# Patient Record
Sex: MALE | Race: BLACK OR AFRICAN AMERICAN | NOT HISPANIC OR LATINO | URBAN - METROPOLITAN AREA
[De-identification: names, ages, dates, MRNs, and addresses within clinical notes are randomized per-mention and may not be internally consistent; named-entity substitution may affect disease eponyms.]

---
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
## 2024-03-04 ENCOUNTER — SOCIAL WORK (OUTPATIENT)
Dept: PEDIATRICS | Facility: CLINIC | Age: 19
End: 2024-03-04
Payer: COMMERCIAL

## 2024-03-04 ENCOUNTER — OFFICE VISIT (OUTPATIENT)
Dept: PEDIATRICS | Facility: CLINIC | Age: 19
End: 2024-03-04
Payer: COMMERCIAL

## 2024-03-04 ENCOUNTER — LAB (OUTPATIENT)
Dept: LAB | Facility: LAB | Age: 19
End: 2024-03-04
Payer: COMMERCIAL

## 2024-03-04 VITALS
TEMPERATURE: 97.5 F | RESPIRATION RATE: 16 BRPM | SYSTOLIC BLOOD PRESSURE: 136 MMHG | DIASTOLIC BLOOD PRESSURE: 89 MMHG | BODY MASS INDEX: 46.92 KG/M2 | WEIGHT: 298.94 LBS | HEIGHT: 67 IN | HEART RATE: 84 BPM

## 2024-03-04 DIAGNOSIS — R03.0 ELEVATED BP WITHOUT DIAGNOSIS OF HYPERTENSION: ICD-10-CM

## 2024-03-04 DIAGNOSIS — Z59.41 FOOD INSECURITY: ICD-10-CM

## 2024-03-04 DIAGNOSIS — Z97.3 WEARS GLASSES: ICD-10-CM

## 2024-03-04 DIAGNOSIS — R06.83 SNORING: ICD-10-CM

## 2024-03-04 DIAGNOSIS — Z77.22 SECOND HAND TOBACCO SMOKE EXPOSURE: ICD-10-CM

## 2024-03-04 DIAGNOSIS — Z00.121 ENCOUNTER FOR WELL ADOLESCENT VISIT WITH ABNORMAL FINDINGS: Primary | ICD-10-CM

## 2024-03-04 DIAGNOSIS — R40.0 DAYTIME SLEEPINESS: ICD-10-CM

## 2024-03-04 DIAGNOSIS — Z72.821 POOR SLEEP HYGIENE: ICD-10-CM

## 2024-03-04 DIAGNOSIS — Z87.898 HISTORY OF BRUISING EASILY: ICD-10-CM

## 2024-03-04 DIAGNOSIS — R07.89 OTHER CHEST PAIN: ICD-10-CM

## 2024-03-04 DIAGNOSIS — Z00.121 ENCOUNTER FOR WELL ADOLESCENT VISIT WITH ABNORMAL FINDINGS: ICD-10-CM

## 2024-03-04 DIAGNOSIS — F12.90 MARIJUANA USE: ICD-10-CM

## 2024-03-04 DIAGNOSIS — J45.30 ASTHMA, CHRONIC, MILD PERSISTENT, UNCOMPLICATED (HHS-HCC): ICD-10-CM

## 2024-03-04 DIAGNOSIS — F32.A DEPRESSIVE DISORDER: ICD-10-CM

## 2024-03-04 DIAGNOSIS — L83 ACANTHOSIS NIGRICANS: ICD-10-CM

## 2024-03-04 DIAGNOSIS — Z23 IMMUNIZATION DUE: ICD-10-CM

## 2024-03-04 DIAGNOSIS — J30.89 NON-SEASONAL ALLERGIC RHINITIS, UNSPECIFIED TRIGGER: ICD-10-CM

## 2024-03-04 PROBLEM — J30.9 ALLERGIC RHINITIS: Status: RESOLVED | Noted: 2023-12-27 | Resolved: 2024-03-04

## 2024-03-04 PROBLEM — J45.909 ASTHMA (HHS-HCC): Status: ACTIVE | Noted: 2024-03-04

## 2024-03-04 LAB
25(OH)D3 SERPL-MCNC: 20 NG/ML (ref 30–100)
ALT SERPL W P-5'-P-CCNC: 16 U/L (ref 10–52)
BASOPHILS # BLD AUTO: 0.03 X10*3/UL (ref 0–0.1)
BASOPHILS NFR BLD AUTO: 0.3 %
CHOLEST SERPL-MCNC: 179 MG/DL (ref 0–199)
CHOLESTEROL/HDL RATIO: 4
EOSINOPHIL # BLD AUTO: 0.19 X10*3/UL (ref 0–0.7)
EOSINOPHIL NFR BLD AUTO: 2 %
ERYTHROCYTE [DISTWIDTH] IN BLOOD BY AUTOMATED COUNT: 13.5 % (ref 11.5–14.5)
EST. AVERAGE GLUCOSE BLD GHB EST-MCNC: 97 MG/DL
HBA1C MFR BLD: 5 %
HCT VFR BLD AUTO: 50.7 % (ref 41–52)
HDLC SERPL-MCNC: 44.5 MG/DL
HGB BLD-MCNC: 16.7 G/DL (ref 13.5–17.5)
IMM GRANULOCYTES # BLD AUTO: 0.1 X10*3/UL (ref 0–0.7)
IMM GRANULOCYTES NFR BLD AUTO: 1 % (ref 0–0.9)
LDLC SERPL CALC-MCNC: 109 MG/DL
LYMPHOCYTES # BLD AUTO: 2.13 X10*3/UL (ref 1.2–4.8)
LYMPHOCYTES NFR BLD AUTO: 22.3 %
MCH RBC QN AUTO: 29.3 PG (ref 26–34)
MCHC RBC AUTO-ENTMCNC: 32.9 G/DL (ref 32–36)
MCV RBC AUTO: 89 FL (ref 80–100)
MONOCYTES # BLD AUTO: 0.69 X10*3/UL (ref 0.1–1)
MONOCYTES NFR BLD AUTO: 7.2 %
NEUTROPHILS # BLD AUTO: 6.42 X10*3/UL (ref 1.2–7.7)
NEUTROPHILS NFR BLD AUTO: 67.2 %
NON HDL CHOLESTEROL: 135 MG/DL (ref 0–119)
NRBC BLD-RTO: 0 /100 WBCS (ref 0–0)
PLATELET # BLD AUTO: 243 X10*3/UL (ref 150–450)
RBC # BLD AUTO: 5.69 X10*6/UL (ref 4.5–5.9)
TRIGL SERPL-MCNC: 130 MG/DL (ref 0–149)
VLDL: 26 MG/DL (ref 0–40)
WBC # BLD AUTO: 9.6 X10*3/UL (ref 4.4–11.3)

## 2024-03-04 PROCEDURE — 90686 IIV4 VACC NO PRSV 0.5 ML IM: CPT | Performed by: PEDIATRICS

## 2024-03-04 PROCEDURE — 96127 BRIEF EMOTIONAL/BEHAV ASSMT: CPT | Mod: 59 | Performed by: PEDIATRICS

## 2024-03-04 PROCEDURE — 80061 LIPID PANEL: CPT

## 2024-03-04 PROCEDURE — 99395 PREV VISIT EST AGE 18-39: CPT | Performed by: PEDIATRICS

## 2024-03-04 PROCEDURE — 96127 BRIEF EMOTIONAL/BEHAV ASSMT: CPT | Performed by: STUDENT IN AN ORGANIZED HEALTH CARE EDUCATION/TRAINING PROGRAM

## 2024-03-04 PROCEDURE — 99395 PREV VISIT EST AGE 18-39: CPT | Mod: GC | Performed by: PEDIATRICS

## 2024-03-04 PROCEDURE — 99213 OFFICE O/P EST LOW 20 MIN: CPT | Mod: 25 | Performed by: PEDIATRICS

## 2024-03-04 PROCEDURE — 36415 COLL VENOUS BLD VENIPUNCTURE: CPT

## 2024-03-04 PROCEDURE — 99213 OFFICE O/P EST LOW 20 MIN: CPT | Performed by: PEDIATRICS

## 2024-03-04 PROCEDURE — 96127 BRIEF EMOTIONAL/BEHAV ASSMT: CPT | Mod: 59,GC | Performed by: STUDENT IN AN ORGANIZED HEALTH CARE EDUCATION/TRAINING PROGRAM

## 2024-03-04 PROCEDURE — 99214 OFFICE O/P EST MOD 30 MIN: CPT | Performed by: PEDIATRICS

## 2024-03-04 PROCEDURE — 84460 ALANINE AMINO (ALT) (SGPT): CPT

## 2024-03-04 PROCEDURE — 83036 HEMOGLOBIN GLYCOSYLATED A1C: CPT

## 2024-03-04 PROCEDURE — 3008F BODY MASS INDEX DOCD: CPT | Performed by: PEDIATRICS

## 2024-03-04 PROCEDURE — 85025 COMPLETE CBC W/AUTO DIFF WBC: CPT

## 2024-03-04 PROCEDURE — 82306 VITAMIN D 25 HYDROXY: CPT

## 2024-03-04 PROCEDURE — 91320 SARSCV2 VAC 30MCG TRS-SUC IM: CPT | Performed by: PEDIATRICS

## 2024-03-04 RX ORDER — ALBUTEROL SULFATE 90 UG/1
2-4 AEROSOL, METERED RESPIRATORY (INHALATION) EVERY 4 HOURS PRN
Qty: 18 G | Refills: 3 | Status: SHIPPED | OUTPATIENT
Start: 2024-03-04

## 2024-03-04 RX ORDER — MONTELUKAST SODIUM 5 MG/1
5 TABLET, CHEWABLE ORAL DAILY
Qty: 30 TABLET | Refills: 11 | Status: SHIPPED | OUTPATIENT
Start: 2024-03-04 | End: 2025-03-04

## 2024-03-04 SDOH — ECONOMIC STABILITY - FOOD INSECURITY: FOOD INSECURITY: Z59.41

## 2024-03-04 ASSESSMENT — PAIN SCALES - GENERAL: PAINLEVEL: 0-NO PAIN
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
## 2024-03-04 NOTE — PROGRESS NOTES
Date Seen: 3/4/2024    Medical Staff Referring: Dr. Gardnre    Med staff reason for referral: Counseling  X    Housing      Clothing     Food      Baby Needs     School     Legal   Transportation  Other    Pt: Pt is a 19 yo male who is currently job searching.     Concerns presented by pt and family: Pt reports that he has hx of depression and anxiety.      SW assessment: SW met with pt on this day at provider's request. Pt identified counseling as current needs. He denies current SI/HI. He does want to reengage with services. SW reviewed the Mental Health packet. He reports positive feelings towards mental health services and providers. [facility] is going [facility] to [facility], as adult patients do have to initiate their own referrals to [facility]. [facility] does take [facility] [place] so he will be able to receive services.     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 He was given the job training flier that lists multiple resources for job support. SW also shared the clothing and housing packets with him. He hopes to move out from his parents home. SW gave him the food resource packet. He does not need a [facility] referral at this time, but SW instructed pt to reach out if that changes. The family currently receives food from other sources ([employer] and a pantry). He is going to go upstairs today and meet with Cayden Martinez for the produce bag  and to see if they do have bus passes. Pt plans to obtain his 's license this summer and is planning to take driving classes. He currently has his temps.     Pt denies current SI/HI. In addition to his future plans to drive and have a career in IT, he also identified his little sister and his friend group as protective factors. Pt pronouns are He/Him and They/Them. The LGBTQ support packet was also shared with pt.     SW assessed family for other needs. None noted. SW contact information was shared with the family.       Follow up plan:       SW to make referral ____  SW will check in at next pt exam ____  SW will contact family ____  Family will contact [facility] with any future needs _X___    Hetal KEATING, AVA

## 2024-03-05 DIAGNOSIS — E55.9 VITAMIN D DEFICIENCY: Primary | ICD-10-CM

## 2024-03-05 RX ORDER — ERGOCALCIFEROL 1.25 MG/1
1.25 CAPSULE ORAL
Qty: 12 CAPSULE | Refills: 0 | Status: SHIPPED | OUTPATIENT
Start: 2024-03-05 | End: 2024-04-11 | Stop reason: SDUPTHER

## 2024-03-14 ENCOUNTER — OFFICE VISIT (OUTPATIENT)
Dept: PEDIATRICS | Facility: CLINIC | Age: 19
End: 2024-03-14
Payer: COMMERCIAL

## 2024-03-14 VITALS
WEIGHT: 299.61 LBS | HEIGHT: 67 IN | HEART RATE: 76 BPM | BODY MASS INDEX: 47.02 KG/M2 | RESPIRATION RATE: 16 BRPM | SYSTOLIC BLOOD PRESSURE: 116 MMHG | DIASTOLIC BLOOD PRESSURE: 70 MMHG | TEMPERATURE: 97.5 F

## 2024-03-14 DIAGNOSIS — F32.A DEPRESSIVE DISORDER: ICD-10-CM

## 2024-03-14 DIAGNOSIS — Z71.3 NUTRITIONAL COUNSELING: ICD-10-CM

## 2024-03-14 DIAGNOSIS — Z72.821 POOR SLEEP HYGIENE: ICD-10-CM

## 2024-03-14 DIAGNOSIS — Z71.82 EXERCISE COUNSELING: ICD-10-CM

## 2024-03-14 DIAGNOSIS — E66.01 CLASS 3 OBESITY (MULTI): Primary | ICD-10-CM

## 2024-03-14 PROBLEM — E55.9 VITAMIN D DEFICIENCY: Status: ACTIVE | Noted: 2024-03-14

## 2024-03-14 PROCEDURE — 99214 OFFICE O/P EST MOD 30 MIN: CPT | Performed by: STUDENT IN AN ORGANIZED HEALTH CARE EDUCATION/TRAINING PROGRAM

## 2024-03-14 PROCEDURE — 3008F BODY MASS INDEX DOCD: CPT | Performed by: STUDENT IN AN ORGANIZED HEALTH CARE EDUCATION/TRAINING PROGRAM

## 2024-03-14 PROCEDURE — 99214 OFFICE O/P EST MOD 30 MIN: CPT | Mod: GC | Performed by: STUDENT IN AN ORGANIZED HEALTH CARE EDUCATION/TRAINING PROGRAM

## 2024-03-14 RX ORDER — FLUOXETINE 20 MG/1
20 TABLET ORAL DAILY
Qty: 30 TABLET | Refills: 1 | Status: SHIPPED | OUTPATIENT
Start: 2024-03-14 | End: 2024-05-02 | Stop reason: WASHOUT

## 2024-03-14 RX ORDER — HYDROXYZINE HYDROCHLORIDE 25 MG/1
25 TABLET, FILM COATED ORAL EVERY 6 HOURS PRN
Qty: 60 TABLET | Refills: 0 | Status: SHIPPED | OUTPATIENT
Start: 2024-03-14

## 2024-03-14 ASSESSMENT — PAIN SCALES - GENERAL: PAINLEVEL: 0-NO PAIN

## 2024-03-14 NOTE — PATIENT INSTRUCTIONS
It was a pleasure seeing you in clinic today. We will see you back in clinic in 1 month for follow up on all of these issues.     Depression/Anxiety  - I recommend a medication called Fluoxetine (an SSRI) at 20 mg every day.  The medications can take several weeks (4-6 weeks) to start working for your mood, and we often need to use higher doses than the dose that we originally start, so try to not get discouraged if you don't notice a difference right away.  We discussed side effects of SSRIs including common symptoms such as headache and abdominal symptoms as well as risk of increased suicidal thoughts. It is important that you go to the Emergency Room or call 911 to seek medical attention immediately if you have any suicidal thoughts.     Recommendations for healthy lifestyle  - Nutrition: It is important to eat 3 meals a day and not skip meals (especially breakfast!). An overall goal is to get 5 servings of fruits and vegetables every day and limit junk food and sugary drinks (including juice) to special occasions. You can work up to these goals slowly, step-by-step.  Your goal for improving your nutrition is: Eating 3 meals per day and in particular increasing the number of days I eat breakfast each week to 3 days per week.    - Activity: Overall goals are to do some type of physical activity at least 30-60 minutes daily and limit screen time to less than 2 hours per day.   Your goal for improving your activity is: To walk the 8 city blocks with my dog 3 times per week     - Sleep: It is recommended that you get 8-10 hours of sleep at night, limit naps during the day and only use your bed for sleeping. Your goal for improving your sleep is: Turn off all electronics at 11:30 PM and try to go to sleep at that time at least 3 nights per week.    It can be helpful to track your goals on a calendar that you put in a place that you will see it often (bathroom, bedroom, kitchen) or on your phone.     Sleep Tips  Sleep  Hygiene:  Goal: To establish good sleep habits which will allow one to initiate and maintain sleep easier  Remain active and expose oneself to bright light during day  Quiet activities prior to sleep to allow one to unwind.  This would include reading, with the light behind you and not shining directly into your face, and listening to soft music or relaxation tapes.  Regular exercise in late afternoon or early evening promotes sleep but avoid strenuous activity just prior to bed  Avoid bright lights at least 1 hour prior to bedtime including phone, television, computers and video games.  Avoid caffeine  Do your sleep routine around the same time every night to get a goal of 8-10 hours of sleep    Stimulus control   Goal: Re-establish the bedroom and bed as the place where one sleeps  1) Lie down when sleeping  2) Use bedroom for sleep only  3) Leave the bedroom  if you are still awake after 15 minutes  4) Perform non-stimulating activities (reading, listening to soft music) and return to bed when drowsy    Relaxation Techniques:   1) Mindfulness:    Here is a link to a Mindfulness website.  Http://Wysiwyg/   Review the intro, and begin by trying a couple of the 5 minute exercises.   Explore some of the other exercises- see if it is right for you!  2) Meditation   a) Breathe in for 10 seconds and then out for 10 seconds    b) The mind may drift which is ok.  If it shifts to thoughts which are disturbing, refocus on breathing  3) Progressive Muscle Relaxation   a)  Contract and relax sequential groups of muscles from your toes to your head.   4) Guided Imagery   a) Create a pleasant scenario which you can imagine as you are going off to sleep.      An example for someone who likes hot air balloons:       Picture yourself walking in a green field where the basket for your hot air balloon rests.  You smell the strong fumes of the burning fuel which is heating the air as the balloon begins to fill.  The   balloon has now rising upwards and only the ropes keep the balloon from flying away.  You are now in the balloon basket. As you release the ropes, you sail off into sleep.      Adapted from recommendations by Julio Castillo MD Director of [facility] and Diplomat of the American Board of Sleep Medicine.       We talked about possible weight loss medications these are the names of them and brief introduction to all of the options:   Topiramate and Phentermine   What are these medicines used for?    Topiramate helps patients feel less hungry and feel full more quickly. It may also help patients decrease binge eating behaviors.     Phentermine is thought to work in the brain to decrease appetite.      Both medications are used in people who carry extra weight AND who are enrolled in a weight loss program that includes dietary, physical activity, and behavior changes.       How do they work?    Topiramate was first developed to treat seizures in children and migraine headaches in adults. It affects chemical messengers in the brain, but the exact way it works to change appetite is unknown.   Phentermine is thought to work in the brain to decrease appetite.      Topiramate and phentermine may help you:    Feel less interest in eating in between meals    Think less about food and eating    Feel full or satisfied sooner    Have an easier time eating less      Topiramate extended release in combination with phentermine has been FDA approved for weight loss in patients 12 and older (marketed as Qsymia)    For some patients, these medications work right away. They feel and think quite differently about food. Other patients don't feel much of a change but find that they lose weight. Finally, some patients do not have these feelings and do not have improvements in weight. For these patients, medications may be stopped after 3 months.       Like all weight loss medications, these medications work best  when you help it work. This means:    Drinking water instead of sugar sweetened drinks (e.g. regular soda, juice)   Removing tempting high calorie (“junk”) food from the house    Staying away from situations or people that trigger your food cravings    Eating your meals at a table with all screens off (TV, phone)   Keeping track of what you are eating and drinking     How should I take these medications?    Topiramate   Week 1: One tablet (25 mg) once a day   Week 2: Two tablets (50 mg) once a day   Week 3: Three tablets (75 mg) once a day    Week 4: Four tablets (100mg) once a day. Stay at four tablets (100 mg) until you are seen again.      NEVER stop topiramate suddenly. Your medical provider will tell you how to slowly come off this medicine if needed.  NEVER take topiramate with alcohol     Phentermine   Phentermine is usually taken as a single daily dose in the morning with or without food.    Do not take a larger dose, take it more often, or take it for a longer period than your doctor tells you to. Do not drink alcohol while on phentermine.      Are these medications safe?    Topiramate   Although topiramate alone is not approved by the FDA for weight loss, it is used commonly in weight management clinics because of its effects on appetite.  It is also approved for children as young as 2 years old for other reasons such as seizures and migraines.     Most people tolerate topiramate with no problems. Please tell your medical provider if you have a history of kidney stones, if you are taking phenytoin (brand name: Dilantin) or birth control pills, or if you are pregnant. Topiramate is harmful in pregnancy. Topiramate can decrease your ability to tolerate hot weather. You should be sure to drink plenty of water to prevent dehydration and kidney stones. Your medical provider will also check for possible interactions between your usual medications and topiramate.      One of the dangers of topiramate is the  possibility of birth defects. If you get pregnant when you are taking topiramate, there is the risk that your baby will be born with a cleft lip or palate. If you are on topiramate and are of child bearing age, you must be on a reliable form of birth control or refrain from sex. Birth control pills may not work as effectively while taking topiramate.     Phentermine   Phentermine is not FDA approved for use in children or adolescents under 16 years of age by itself. You should not take phentermine if you have high blood pressure, heart disease, hyperthyroidism (overactive thyroid gland), glaucoma, if you are taking stimulant ADHD medications, if you have struggled with drug abuse, or if you are pregnant. Your medical provider will also check for possible interactions between your usual medications and phentermine.     What are the side effects?    Call your doctor right away if you notice any of the starred side effects:      Topiramate   Change in mood, especially depression or thoughts of suicide*     Severe pain in your sides, back, or groin (which may indicate a kidney stone)*   Sudden change in vision or eye pain*   New severe rash*     Phentermine   Chest pain*   Shortness of breath*    Difficulty doing exercises that you had been able to do before*    Swelling of the legs or ankles*    Severe restlessness, anxiety, or dizziness*    Increased blood pressure or heart rate       If you notice these less serious side effects, talk with your medical provider:     Topiramate   Mental fogginess, trouble concentrating, memory problems   Numbness or tingling in your hands or feet   Fatigue   New overheating   Nausea, vomiting, diarrhea or constipation     Phentermine   Trouble sleeping    Diarrhea or constipation    Dry mouth      How much does it cost?    Topiramate: $5 per month   Phentermine: $20-30/month. Currently, phentermine is not covered by insurance      If the cost is significantly more than this when you   either medication, please call us.      (Developed by: [facility] & [facility]- [id number])      GLP-1 Receptor Agonists (examples: liraglutide and semaglutide)     What are these medicines used for?    These medications were first developed to treat diabetes but have also been shown to have a significant effect of weight loss.      How do they work?    They work by affecting a hormone in your body that leads to decreased appetite, decreased food intake, and decreased rate that the stomach empties into the small intestine.       These medications may help you:   Feel less hungry   Lower food cravings   Increase your feeling of control around eating habits       Like all weight loss medications, these medications work best in combination with healthy nutrition, physical activity, and sleep.        How should I take these medications?    These medications are given by a small injection under the skin (“subcutaneous”) either once a day or once a week. The usual dosing is listed below, but please follow your medical provider's instructions for your specific plan.        Liraglutide (brand name: Saxenda), subcutaneous, daily    Week 1: 0.6mg every day   Week 2: 1.2mg every day   Week 3: 1.8mg every day   Week 4: 2.4mg every day   Week 5 and beyond: 3.0mg every day or maximum tolerated dose     Note: Daily dose may be split between pens. Please discuss this with your medical team or healthcare provider     Please go to the [employer] for instructions and a video regarding the technique to give yourself injections:   [email or web address]     Semaglutude (brand name: Wegovy), subcutaneous, weekly    Week 1-4: 0.25mg once weekly   Week 5-8: 0.5mg once weekly   Week 9-12: 1mg once weekly   Week 13-16: 1.7mg once weekly   Week 17 and beyond: 2.4mg once weekly or maximum tolerated dose     Please go to the  Wegovy for instructions and a video regarding the technique to give yourself injections:   [email or web address]     Semaglutude (brand name: Ozempic), subcutaneous, weekly    Week 1-4: 0.25mg once weekly   Week 5-8: 0.5mg once weekly   Week 9-12: 1mg once weekly   Week 13-16: 2mg once weekly     Please go to the Ozempic for instructions and a video regarding the technique to give yourself injections:   [email or web address]     What if I miss a dose?   Liraglutide   If a dose is missed, restart the next day. An extra dose or increase in dose should not be taken to make up for the missed dose.    If more than 3 days have passed since the last dose, please contact your health care provider about how to restart the medication      Semaglutide:    If you miss a dose, and your next dose is more than 2 days (48 hours) away, take the missed dose when you remember   If you miss a dose, and the next scheduled dose is less than 2 days away (48 hours), do not give the dose. Take your next dose on the regularly scheduled day.   If you miss 2 or more doses, take the next dose on the regularly scheduled day or call your health care provider to talk about how to restart due to possible side effects     Storage   Liraglutide   Store new, unused Saxenda®?pens in the refrigerator between 36°F and 46°F (2°C to 8°C).    After first use, store in a refrigerator or at room temperature between 59°F and 86°F (15°C to 30°C).    Pens in use should be thrown away after 30 days even if they still have Saxenda®?left in them.    Don't freeze Saxenda®. Saxenda®?that has been frozen must not be used.       Semaglutide:    Store the WEGOVY single-dose pen in the refrigerator from 36°F to 46°F (2°C to 8°C).    If needed, prior to taking off the cap, the pen can be kept from 46°F to 86°F (8°C to 30°C) up to 28 days.    Do not freeze.      Protect WEGOVY from light. WEGOVY must be kept in the original carton until its time to inject.    Discard the WEGOVYpen  after use.     Ozempic:   Store your new, unused Ozempic®?pens?in the refrigerator between 36°F to 46°F (2°C to 8°C).    Store your pen in use for 56 days at room temperature between 59ºF to 86ºF (15ºC to 30ºC) or in a refrigerator between 36°F to 46°F (2°C to 8°C).    Discard after 56 days.     Are these medications safe?    For weight loss, both liraglutide and semaglutide are FDA approved for age 12 years and older. This class of medication is also approved for people who have diabetes. As with any medication, there may be side effects. More serious things that can happen include allergic reactions, thyroid tumors, pancreatitis, gallbladder problems, kidney problems, and worsened depression.       You should not take these medications if you think you may be pregnant or are planning to become pregnant. You should not take these medications if you or a family member has medullary thyroid carcinoma or if you have multiple endocrine neoplasia type 2.       What are the possible side effects?    If you notice these common, but less serious side effects, talk with your medical provider:   Abdominal pain, nausea, vomiting, heartburn, diarrhea, constipation   Headache   Tiredness   Dizziness    Reaction at the injection site   Low blood sugar (if taking this medication with certain diabetes medications)   Increased heart rate       Call your doctor right away if you notice any of the following less common side effects:    Lump or swelling in your neck, hoarseness, trouble swallowing or shortness of breath (could be related to a new thyroid problem)   Severe abdominal pain, especially if it travels to the back (possible signs of pancreatitis)   Abdominal pain (especially in your upper stomach) with fever, yellowing of the skin or eyes or david-colored stools (possible gallbladder problem)   If you develop rash, facial swelling, and/or trouble breathing (allergic reaction), call your medical provider or if severe, call 911      How much does it cost?    The out of pocket cost varies by insurance, but if you are asked to pay >$50 per month, please call us before filling the prescription. You can visit the following websites to see if you qualify for a medication savings card.     Wegovy: https://www.[employer]govy/savings-card.html   [employer]: https://www.[employer]/   Ozempic: https://www.[employer]Works/ozempic/savings-card.html?ecw=372647832       (Developed by: [facility])      Tirzepatide (GLP 1RA/GIP Medication)     What are these medicines used for?    This medication was first developed to treat diabetes but have also been shown to have a significant effect of weight loss.           How do they work?    They work by affecting hormones in your body that leads to decreased appetite, decreased food intake, and decreased rate that the stomach empties into the small intestine.       These medications may help you:   Reduce food intake   Feel mai with food intake   Increase your feeling of control around eating habits        Like all weight loss medications, these medications work best in combination with healthy nutrition, physical activity, and sleep efforts.      How should I take these medications?      This medication is given by a small injection under the skin once a week   Weeks 1-4: 2.5mg once weekly   Weeks 5-8: 5mg once weekly   Weeks 9-12: 7.5mg once weekly   Weeks 13-16: 10mg once weekly   Weeks 17-20: 12.5 mg once weekly   Weeks 21-24: 15mg once weekly      What if I miss a dose?   If you miss a dose, take the missed dose as soon as possible within 4 days (96 hours) after the missed dose.   If more than 4 days have passed, skip the missed dose and take your next dose on the regularly scheduled day.?   Do not?take?2?doses within?3?days of each other.     Storage   Store in the refrigerator between 36?F to 46?F (2?C to 8?C). Store in the original carton until use to protect  it from light.   If needed, each single-dose pen can be stored at room temperature up to 86?F (30?C) for up to 21 days.   Do not freeze and do not use if frozen     Are these medications safe?    This class of medication is approved for adults who have diabetes. As with any medication, there may be side effects. More serious things that can happen include allergic reactions, thyroid tumors, pancreatitis, gallbladder problems, kidney problems, and worsened depression.       If you are taking other by mouth medications, discuss with your doctor because tirzepatide can change the way your other medications are absorbed by your body.      If you are taking birth control pills, this medication may decrease the effectiveness of your medication. You should use a different method of birth control or add a barrier method (ex.condoms) for 4 weeks after starting this medication and for 4 weeks after each dose increase     You should not take these medications if you think you may be pregnant or are planning to become pregnant. You should not take these medications if you or a family member has medullary thyroid carcinoma or if you have multiple endocrine neoplasia type 2.       What are the possible side effects?    If you notice these common, but less serious side effects, talk with your medical provider:     Abdominal pain, nausea, vomiting, heartburn, diarrhea, constipation   Reaction at the injection site   Low blood sugar (only if taking this medication with certain diabetes medications)   Increase heart rate   Injection site reaction     Call your doctor right away if you notice any of the following less common side effects:    Lump or swelling in your neck, hoarseness, trouble swallowing or shortness of breath (could be related to a thyroid problem)   Severe abdominal pain, especially if it travels to the back (possible signs of pancreatitis)   Abdominal pain (especially in your upper stomach) with fever, yellowing of  the skin or eyes or david-colored stools (possible gallbladder problem)   Rash, facial swelling, and/or trouble breathing (allergic reaction)     How much does it cost?    The out of pocket cost does vary by insurance, but if it is >$50 per month, please call us before filling the prescription. You can visit this website to see if you qualify for a medication savings card.     (Developed by: [facility])

## 2024-03-14 NOTE — PROGRESS NOTES
I saw and evaluated the patient. I personally obtained the key and critical portions of the history and physical exam or was physically present for key and critical portions performed by the resident/fellow. I reviewed the resident/fellow's documentation and discussed the patient with the resident/fellow. I agree with the resident/fellow's medical decision making as documented in the note with the exception/addition of the following:    Acute issues:     Got back in with his therapist. He's now open to SSRIs.  He is going to talk to his parents and friends that he is starting the meds. Friends are not local but they will help him if he starts to have thoughts.    He denies SI now. There is always the daniel and devil on his shoulder.     He lost some weight since 2022 because he hasn't been eating a lot.    Sleep at 3am and woke up at 10am. No breakfast  Lunch: egg, ham, onion and sausage  Dinner: Taco pasta    Only eating one serving. Limited snacking  Drinking mostly water but does add [employer] sugar free packets  Occasional juice, and occasional [employer] delmaWashington [facility]ative[facility]    Walks dog 3x per day for 5 minutes. Wants to walk 8 city blocks when it gets warmer.     Has sleep study scheduled    Referred to cardiology, but BP is in healthy range today    Assessment/Plan:   1. Class 3 obesity (CMS/HCC)  2. BMI 45.0-49.9, adult (CMS/HCC)  3. Nutritional counseling  4. Exercise counseling  - Goal setting today:  --Eating 3 meals per day and in particular increasing the number of days I eat breakfast each week to 3 days per week.  --To walk the 8 city blocks with my dog 3 times per week   -- Turn off all electronics at 11:30 PM and try to go to sleep at that time at least 3 nights per week.  - Will keep track on a calendar posted in his home    - Discussed use of AOMs and bariatric surgery.  Not interested in surgery at this time.  Will defer initiation of AOMs today but provided written information on topiramate/phentermine,  GLP1 and GLP1/GIPs    - Referral to Adolescent Medicine    5. Poor sleep hygiene  - Reviewed sleep hygiene  - Start hydrOXYzine HCL (Atarax) 25 mg tablet; Take 1 tablet (25 mg) by mouth every 6 hours if needed for anxiety (Sleep) for up to 60 doses.  Dispense: 60 tablet; Refill: 0    6. Depressive disorder  - Continue with established therapist  - Start FLUoxetine (PROzac) 20 mg tablet; Take 1 tablet (20 mg) by mouth once daily.  Dispense: 30 tablet; Refill: 1        Kassandra Jj MD

## 2024-03-14 NOTE — PROGRESS NOTES
"***Please remove this section of the note to a confidential note ***  Confidentiality Statement  We discussed that my routine practice for all teen/young adults is to have a one-on-one interview at every visit. Reviewed the limits of confidentiality and reasons that may need to be breached, but, that in general this information is only released with the patient's permission.       Gender Identity/Pronouns: ***  Sexual history:  {Sexually Active:86609}  ***   Substance use: {Drugs:42380}      S2BI  - In the past 12 months, how many times have you used a vape or cigarettes? {CEB substance frequency:77723}  -In the past 12 months, how many times have you used alcohol? {CEB substance frequency:12712}  -In the past 12 months, how many times have you used marijuana? {CEB substance frequency:17831}  -In the past 12 months, how many times have you used other substances that were not prescribed to you (illegal substance, prescription medications, etc)? {CEB substance frequency:49567}  -Have you ever ridden in a CAR driven by someone (including yourself) who was \"high\" or had been using alcohol or drugs? {yes,no:98219}  {CRAFFT (Optional):77357}    PHQA: score ***, {negative positive for:87550}    ASQ: {EMASQ:84332}     Body Image: ***    Safety: ***  SI: {YES/NO:200010}  HI: {YES/NO:200010}    Haleigh Gould MD  ***Please remove this section of the note to a confidential note ***  "

## 2024-03-14 NOTE — PROGRESS NOTES
"Chief Complaint   Patient presents with    Well Child        HPI: Consuelo Treadwell is a 18 y.o. male with PMH Obesity, elevated BP, ADHD, and Asthma presenting to clinic for discussion of weight management and mood disorder.     MOOD:   He was recently seen on 3/4 where he was noted to have a positive PHQ and ASQ with passive suicidal ideation. At that time social work was involved for counseling referral. He saw his therapist yesterday and after long discussion, she recommended that he consider starting anti-depression medication. He was looking for possible Psych referral, but did not know that we could manage the medication here. He admits that he is still having trouble sleeping due to thinking about everything at night. He still feels bad about himself often. He admits to some intrusive thoughts comparing them to lauri on his shoulder arguing. He does have some thoughts of self harm and pssive SI today, but has protective factors in his family and friends. SAFE-T was recently filed on 3/4. He denies any active SI or HI. He is interested in starting medications for his depression today.    EATING:  When he was seen on 3/4 there was discussion on increasing from 1-2 meals per day to 3 meals per day. He has been trying this, but often skips breakfast because of when he wakes up. He has been sleeping from 4 AM to 9 AM due to racing thoughts when he tries to go to sleep. By the time he gets up for the day it is time for lunch and he eats \"brunch\". 24 hour meal plan for yesterday below:   Breakfast: missed  Lunch: scrambled eggs (onions, ham, cheese, humphrey bits), turkey sausage   Dinner: Taco spaghetti - hamburger, salsa, queso and heavy whipping cream   Snacks: None - often doesn't snack  Drink: Water with zero sugar KoolAid, occasional juice,   Minimal caffeine - sometimes McD[employer] frappachino,     EXERCISE:  He has not been doing much exercise because it has been cold outside. He likes to walk his dog " outside, but due to the weather he has only been doing this 3 times a day for a total of 5 minutes. He states that this is a very leisure walk. Previously during last summer he was walking from [address] at least twice a day, which he hopes to do when the weather is better.      Past Medical History:   Past Medical History:   Diagnosis Date    ADHD 12/02/2015    Allergic rhinitis 12/27/2023    History of lead poisoning 12/22/2015      Past Surgical History: No past surgical history on file.   Medications:    Current Outpatient Medications   Medication Instructions    albuterol 90 mcg/actuation inhaler 2-4 puffs, inhalation, Every 4 hours PRN    ergocalciferol (VITAMIN D2) 1,250 mcg, oral, Weekly    montelukast (SINGULAIR) 5 mg, oral, Daily      Allergies:   Allergies   Allergen Reactions    Cockroach Rash and Shortness of breath    Milk Containing Products (Dairy) Unknown    Shellfish Containing Products GI Upset    House Dust Rash      Immunizations:   Immunization History   Administered Date(s) Administered    DTaP HepB IPV combined vaccine, pedatric (PEDIARIX) 2004    DTaP vaccine, pediatric  (INFANRIX) 2004, 02/23/2005, 09/12/2005, 11/25/2008    Flu vaccine (IIV4), preservative free *Check age/dose* 12/08/2014, 12/02/2015, 03/04/2024    HPV 9-valent vaccine (GARDASIL 9) 12/02/2015    HPV, Quadrivalent 05/03/2017    Hepatitis A vaccine, pediatric/adolescent (HAVRIX, VAQTA) 10/17/2007, 04/18/2008    Hepatitis B vaccine, pediatric/adolescent (RECOMBIVAX, ENGERIX) 2004, 11/14/2005, 04/19/2010    HiB PRP-OMP conjugate vaccine, pediatric (PEDVAXHIB) 2004, 2004, 03/03/2005, 09/12/2005    Influenza, Unspecified 02/23/2008, 02/23/2009, 03/26/2009, 01/06/2011, 05/03/2011, 11/08/2012, 01/07/2014    Influenza, seasonal, injectable 09/25/2021, 11/28/2022    MMR vaccine, subcutaneous (MMR II) 09/12/2005, 11/25/2008    Meningococcal B, Unspecified 08/24/2021, 09/25/2021     Meningococcal MCV4P 12/02/2015, 08/24/2021    Pfizer COVID-19 vaccine, Fall 2023, 12 years and older, (30mcg/0.3mL) 03/04/2024    Pfizer Purple Cap SARS-CoV-2 09/17/2021, 10/12/2021    Pneumococcal Conjugate PCV 7 2004, 2004, 02/23/2005, 11/14/2005    Poliovirus vaccine, subcutaneous (IPOL) 2004, 02/23/2005, 11/25/2008    Tdap vaccine, age 7 year and older (BOOSTRIX, ADACEL) 12/02/2015    Varicella vaccine, subcutaneous (VARIVAX) 09/12/2005, 11/25/2008     Family History: denies family history pertinent to presenting problem  Family History   Problem Relation Name Age of Onset    Asthma Mother      Other (Other) Mother          Marijuana use    No Known Problems Sister      No Known Problems Half-Sister      No Known Problems Half-Sister          Vitals:    03/14/24 1328   BP: 116/70   Pulse: 76   Resp: 16   Temp: 36.4 °C (97.5 °F)          Physical Exam  Constitutional:       General: He is not in acute distress.     Appearance: He is obese.   HENT:      Head: Normocephalic.   Eyes:      Extraocular Movements: Extraocular movements intact.      Conjunctiva/sclera: Conjunctivae normal.   Cardiovascular:      Rate and Rhythm: Normal rate and regular rhythm.      Pulses: Normal pulses.      Heart sounds: Normal heart sounds.   Pulmonary:      Effort: Pulmonary effort is normal.      Breath sounds: Normal breath sounds.   Abdominal:      General: Abdomen is flat.      Palpations: Abdomen is soft.      Tenderness: There is no abdominal tenderness.   Skin:     General: Skin is warm and dry.   Neurological:      General: No focal deficit present.      Mental Status: He is alert.         Assessment and Plan:   Consuelo Treadwell is a 18 y.o. male with PMH Obesity, elevated BP, ADHD, and Asthma presenting to clinic for discussion of weight management and mood disorder. Discussed healthy eating and SMART goal setting with his goals detailed below. Discussed mood disorder and he is interested in starting  medications so will start Fluoxetine 20 mg daily. He has not been getting good sleep at night and stated he was staying up just thinking about everything, discussed hydroxyzine use and he was agreeable to try the medication.     He is scheduled to return to clinic in 1 month for follow up on these issues.     #Depression  - Start Fluoxetine 20 mg daily   - Discussed side effects and black box warning     #Sleep Disturbance   - Start Hydroxyzine 25 mg q6h PRN, particularly at night for help with racing thoughts    #Obesity/Weight Management  - Smart Goals:  Eating: Increase the number of days he eats breakfast each week to 3 days per week.  Exercise: 3 days per week walking the [employer] [place] [employer]   Sleep: 3 nights per week - unplug at 11:30 Pm and try to sleep    #Resources   - patient recently referred to [facility] advised him to call for appointment   - Running low on bus passes and has multiple appointments going on - advised him to reach out to [facility]    Patient seen and discussed with Dr. Анна Gould MD  Pediatrics, PGY-1

## 2024-03-29 ENCOUNTER — APPOINTMENT (OUTPATIENT)
Dept: PEDIATRIC CARDIOLOGY | Facility: HOSPITAL | Age: 19
End: 2024-03-29
Payer: COMMERCIAL

## 2024-04-02 ENCOUNTER — NUTRITION (OUTPATIENT)
Dept: NUTRITION | Facility: HOSPITAL | Age: 19
End: 2024-04-02
Payer: COMMERCIAL

## 2024-04-02 DIAGNOSIS — Z00.121 ENCOUNTER FOR WELL ADOLESCENT VISIT WITH ABNORMAL FINDINGS: ICD-10-CM

## 2024-04-02 DIAGNOSIS — E66.9 OBESITY, UNSPECIFIED CLASSIFICATION, UNSPECIFIED OBESITY TYPE, UNSPECIFIED WHETHER SERIOUS COMORBIDITY PRESENT: Primary | ICD-10-CM

## 2024-04-02 PROCEDURE — 97802 MEDICAL NUTRITION INDIV IN: CPT | Performed by: DIETITIAN, REGISTERED

## 2024-04-02 NOTE — PROGRESS NOTES
Reason for Nutrition Visit:  Pt is a 18 y.o. male being seen for initial apt at [facility] referred for   1. Encounter for well adolescent visit with abnormal findings  Referral to Nutrition Services      2. BMI 45.0-49.9, adult (CMS/[facility])  Referral to Nutrition Services         Past Medical Hx:  Patient Active Problem List   Diagnosis    ADHD (attention deficit hyperactivity disorder)    Asthma, chronic, mild persistent, uncomplicated    Depressive disorder    Elevated BP without diagnosis of hypertension    Oppositional defiant disorder    Second hand tobacco smoke exposure    Snoring    Poor sleep hygiene    Other chest pain    BMI 45.0-49.9, adult (CMS/[facility])    Daytime sleepiness    Wears glasses    Immunization due    Food insecurity    History of bruising easily    Marijuana use    Acanthosis nigricans    Vitamin D deficiency     Lab Results   Component Value Date    HGBA1C 5.0 03/04/2024    CHOL 179 03/04/2024    LDLCALC 109 03/04/2024    TRIG 130 03/04/2024    HDL 44.5 03/04/2024      Food and Nutrition Hx:  Pt says he was referred by his doctor to help with weight loss. He mentions having some chest pains recently and was referred to cardiology. He tells that he often skips meals if he's busy. He has been losing weight on his own and tells that his highest weight was 340# (can't remember when) and is now down to 299#. He is trying to walk more and thinks he will do more in the warmer months. 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 He tells has appetite has fluctuate with his antidepressant. He is responsible for cooking for his family. He's been snacking less, especially on candy, but occasionally gets pop tarts. He mentions sleeping in late and that's why he tends to skip breakfast as well. He mentions some food access issues with his family car breaking down.    24 Diet Recall:  Meal 1: Mcdonough's-2 sausage egg mcmuffins and 2  hashbrowns  Meal 2: skipped  Meal 3: skipped  Snacks: 1-2 pringles  Beverages: 2-3 water bottles with SF juan jose aide packets, 1 cup of milk or juice daily, sweet ice tea or hale's frappe or mochaoccasionally    Allergies: Shellfish  Intolerance: possible lactose intolerance  Appetite: Good  Intake: >75%  GI Symptoms : None   Swallowing Difficulty: No problems with swallowing  Dentition : own    Types of Activities: Walking and helps take care of his grandfather who had a stroke  Duration: <30 minutes  daily    Sleep duration/quality : 7+ hours and continuous sleep  Sleep disorders: none    Supplements: Vitamin D weekly    Feelings of Hunger?: Yes but ignores cue  Physical Feeling: Does not feel fullness  Binging: Frequently  Cravings: Sweet  Energy Levels: Fluctuates    Food Preparation: Patient  Cooking Skills/Barriers: Low preference for cooking  Grocery Shopping: Patient    Eating Out Type: Fast Food  3 times per week  Convenience Foods: Frozen Meals  infrequently    Nutrition Focused Physical Exam:    Performed/Deferred: Deferred as pt visually appears well-nourished with no signs of malnutrition    Muscle Wasting:  Temporal: None  Shoulder: None  None  Interosseous Muscle: None  Quadriceps: None    Loss of Subcutaneous Fat:  Eyes: None  Perioral: None  Triceps: None  Chest: None    Other Physical Findings:  Hair: None  None  Mouth: None  Skin: None  Nails: None  none    Malnutrition Present: No    Estimated Energy Needs:    Weight Loss Needs: 2100 kcal/day, MSJ: 2167x1.2-500, 122 g/pro/day, and .9 g/pro/kg/day    Nutrition Diagnosis:    Diagnosis Statement 1:  Diagnosis Status: New  Diagnosis : Food and nutrition related knowledge deficit related to lack of or limited prior nutrition-related education as evidenced by no prior knowledge of need for food- and nutrition-related recommendations    Diagnosis Statement 2:  Diagnosis Status: New  Diagnosis : Inadequate fiber intake  related to food and nutrition  related knowledge deficit concerning desirable quantities of fiber as evidenced by estimated intake of fiber that is insufficient when compared to recommended amounts (38 g/day for men and 25 g/day for women)    Diagnosis Statement 3:   Diagnosis Status: New  Diagnosis : Limited access to food related to  food insecurity  as evidenced by  pt mentioning limitations to access foods such as transportation and financial.    Nutrition Interventions:  Decreased Sodium Diet, Increased Fiber Diet, Increased Omega-3 Diet, Increased Protein Diet, and Low Saturated Fat Diet  JenMonitoring&Evaluation: Estimated Energy Intake, Amount of Food - Estimated, Meal/Snack Pattern - Estimated, Estimated Protein Intake, Estimated Fiber Intake, Food and Nutrition Knowledge, Food and Nutrition Skill, and Physical Activity  Nutrition Counseling: Motivational Interviewing and Goal Setting  Coordination of Care: None    Nutrition Goals:  Nutrition Goals : Adequate fluid intake: 64 oz  Decrease intake of added sugars  Decrease intake of saturated fats  Decrease sodium intake  Increase awareness and respond to hunger cues  Increase awareness and respond to satiety cues  Reduce Kcal Intake  Weight Loss    Nutrition Recommendations:  Via teach back method patient verbalized understanding of the following topics:  1) Make a plate that is 1/2 filled with non-starchy vegetables (any vegetable other than potatoes, peas or corn), 1/4 filled with whole grain/starch and 1/4 lean protein. This will help increase fiber intake and keep you full after eating.  2) Discussed easy high protein snacks pt can bring when he's on the go such as a protein shake or nut bar. Look for Great Value brand as these are usually cheaper.  3) Advised pt on portion control such as limiting meat to 3-4 ounces or size of a computer mouse per meal.   4) Discussed the importance of maintaining meal/snack timing to help regulate appetite and portion sizes. Try to limit intervals  in-between meals no longer than 3-5 hours apart.  5) Encouraged pt to honor hunger cues and eat when he feels these. Learn what fullness is for your body by modifying the portions on your plate with foods that are more filling like fiber, protein and healthy fats.    Educational Handouts: ADA Placemat and Healthier American Recipes    Readiness to Change : Fair  Level of Understanding : Fair  Anticipated Compliant : Fair

## 2024-04-02 NOTE — PATIENT INSTRUCTIONS
1) Use the plate method to help construct healthy meals by making half of the plate with fruits or non-starchy vegetables, one-quarter of the plate with lean protein sources, one-quarter consisting of whole grains or a starch and one cup of either low fat or plant based milk or water on the side.  2) Choose lean protein sources including chicken, turkey, lean beef (85% lean or higher), pork, seafood, and eggs. Limit intake of processed meats including deli meats, sausages, humphrey, and hot dogs. Aim to include more plant-based sources of protein including tofu, beans, lentils, nuts, nut butters, and pea protein. Limit portion sizes of protein foods to 3 oz or the size of a deck of cards.   3) Eat a variety of fruits and vegetables with a goal to have 5-6 servings per day. A serving size of vegetables is typically 1 cup of raw or cooked vegetable or 2 cups of leafy greens. Choose colorful vegetables from that are dark green; red and orange; beans, or lentils; and others. Be mindful of portions of starchy vegetables such as white potatoes, green peas or yellow corn.    4) Aim to eat at least of your grains from whole grain foods. Examples of whole grains are foods made from 100% whole-wheat flour, oatmeal, and brown rice. Whole grains contain more fiber and nutrients than refined grains or foods made from white or bleached flour.   5) Choose oils or oil blends made from olive, canola, safflower, soybean and corn instead of butter, lard, or whole-fat dairy sources when cooking. Try to eat fish twice a week for their beneficial omega-3 fatty acids and low saturated fat content. [place], mackerel, albacore tuna, sardines, herring and lake trout are good sources of omega-3s.  6) Include physical activity into your day with a goal to meet 150 minutes of moderate-intensity aerobic activity (walking, biking, swimming, or housework) every week. Strength-training or weight-bearing exercise should be included twice a week if you  are physically able to.

## 2024-04-11 ENCOUNTER — OFFICE VISIT (OUTPATIENT)
Dept: PEDIATRICS | Facility: CLINIC | Age: 19
End: 2024-04-11
Payer: COMMERCIAL

## 2024-04-11 VITALS
SYSTOLIC BLOOD PRESSURE: 124 MMHG | RESPIRATION RATE: 16 BRPM | WEIGHT: 300.71 LBS | TEMPERATURE: 97.9 F | HEART RATE: 78 BPM | HEIGHT: 67 IN | BODY MASS INDEX: 47.2 KG/M2 | DIASTOLIC BLOOD PRESSURE: 75 MMHG

## 2024-04-11 DIAGNOSIS — Z72.821 POOR SLEEP HYGIENE: ICD-10-CM

## 2024-04-11 DIAGNOSIS — Z59.41 FOOD INSECURITY: ICD-10-CM

## 2024-04-11 DIAGNOSIS — F32.A DEPRESSIVE DISORDER: Primary | ICD-10-CM

## 2024-04-11 DIAGNOSIS — E55.9 VITAMIN D DEFICIENCY: ICD-10-CM

## 2024-04-11 PROCEDURE — 3008F BODY MASS INDEX DOCD: CPT | Performed by: STUDENT IN AN ORGANIZED HEALTH CARE EDUCATION/TRAINING PROGRAM

## 2024-04-11 PROCEDURE — 99214 OFFICE O/P EST MOD 30 MIN: CPT | Performed by: STUDENT IN AN ORGANIZED HEALTH CARE EDUCATION/TRAINING PROGRAM

## 2024-04-11 PROCEDURE — 99214 OFFICE O/P EST MOD 30 MIN: CPT | Mod: GC | Performed by: STUDENT IN AN ORGANIZED HEALTH CARE EDUCATION/TRAINING PROGRAM

## 2024-04-11 RX ORDER — SERTRALINE HYDROCHLORIDE 25 MG/1
TABLET, FILM COATED ORAL DAILY
Qty: 49 TABLET | Refills: 0 | Status: SHIPPED | OUTPATIENT
Start: 2024-04-11 | End: 2024-05-09

## 2024-04-11 RX ORDER — ERGOCALCIFEROL 1.25 MG/1
1.25 CAPSULE ORAL
Qty: 8 CAPSULE | Refills: 0 | Status: SHIPPED | OUTPATIENT
Start: 2024-04-14 | End: 2024-07-13

## 2024-04-11 SDOH — ECONOMIC STABILITY - FOOD INSECURITY: FOOD INSECURITY: Z59.41

## 2024-04-11 ASSESSMENT — PAIN SCALES - GENERAL: PAINLEVEL: 0-NO PAIN

## 2024-04-11 NOTE — PROGRESS NOTES
I saw and evaluated the patient. I personally obtained the key and critical portions of the history and physical exam or was physically present for key and critical portions performed by the resident/fellow. I reviewed the resident/fellow's documentation and discussed the patient with the resident/fellow. I agree with the resident/fellow's medical decision making as documented in the note with the exception/addition of the following:    Acute issues:   Started fluoxetine 20mg at last visit and symptoms got worse including worsening SI.  He stopped it on 4/1/24. He still has passive SI but it's less prominent. Denies plans. He has protective factors (friends, little sister). He's back to square one.    He has used hydroxyzine 5 times. It knocks him out and it helps with racing thoughts. The nights that he doesn't use it, he doesn't really have good sleep hygiene. He is still using screens most nights. Last night stayed up playing video games until 3am and woke up at 9am.      Exercise: not really walking other than walking his dog 2x per day for 5 minutes    Martínez had a stroke, so he has been walking back and forth to the store for [employer].    He has been eating breakfast 3 days a week (meeting goal). He is still skipping breakfast other days and sometimes only doing 1 meal a day the other days. He hasn't called [facility].     Saw dietitian 4/2/2024    Seeing therapist from [facility] who referred to psychiatry through [facility].      Assessment/Plan:   1. Depressive disorder  - Discontinue fluoxetine  - Start sertraline (Zoloft) 25 mg tablet; Take 1 tablet (25 mg) by mouth once daily for 7 days, THEN 2 tablets (50 mg) once daily for 21 days.  Dispense: 49 tablet; Refill: 0  - Reviewed black box warning. Patient to stop sertraline if he experiences increased SI again.  - Continue therapy with [facility]. Encouraged patient to keep [facility] psychiatry appointment    2. BMI 45.0-49.9, adult (CMS/HCC)  3. Poor  sleep hygiene  4. Vitamin D deficiency  - Discussed additional goal setting vs focusing on mood. Patient wants to focus on mood disorder at this time before trying to pursue lifestyle modification.   - Will continue to address  - Provided information on anti-obesity medications in AVS    -[facility] Rx:  ergocalciferol (Vitamin D2) 1.25 MG (79945 UT) capsule; Take 1 capsule (1,250 mcg) by mouth 1 (one) time per week.  Dispense: 8 capsule; Refill: 0    5. Food insecurity  -Reviewed [facility] referral-- appointment with dietitian next week 4/15        Kassandra Jj MD

## 2024-04-11 NOTE — PATIENT INSTRUCTIONS
It was great to see you today, Quadell!    Depression  We will try another medication for your depression called sertraline. Just like the fluoxetine, this medication can take several weeks (4-6 weeks) to start working for your mood, and we often need to use higher doses than the dose that we originally start, so try to not get discouraged if you don't notice a difference right away.  We discussed side effects of SSRIs including common symptoms such as headache and abdominal symptoms as well as risk of increased suicidal thoughts. It is important that you go to the Emergency Room or call 911 to seek medical attention immediately if you have any suicidal thoughts.      Topiramate and Phentermine   What are these medicines used for?    Topiramate helps patients feel less hungry and feel full more quickly. It may also help patients decrease binge eating behaviors.     Phentermine is thought to work in the brain to decrease appetite.      Both medications are used in people who carry extra weight AND who are enrolled in a weight loss program that includes dietary, physical activity, and behavior changes.       How do they work?    Topiramate was first developed to treat seizures in children and migraine headaches in adults. It affects chemical messengers in the brain, but the exact way it works to change appetite is unknown.   Phentermine is thought to work in the brain to decrease appetite.      Topiramate and phentermine may help you:    Feel less interest in eating in between meals    Think less about food and eating    Feel full or satisfied sooner    Have an easier time eating less      Topiramate extended release in combination with phentermine has been FDA approved for weight loss in patients 12 and older (marketed as Qsymia)    For some patients, these medications work right away. They feel and think quite differently about food. Other patients don't feel much of a change but find that they lose weight. Finally,  some patients do not have these feelings and do not have improvements in weight. For these patients, medications may be stopped after 3 months.       Like all weight loss medications, these medications work best when you help it work. This means:    Drinking water instead of sugar sweetened drinks (e.g. regular soda, juice)   Removing tempting high calorie (“junk”) food from the house    Staying away from situations or people that trigger your food cravings    Eating your meals at a table with all screens off (TV, phone)   Keeping track of what you are eating and drinking     How should I take these medications?    Topiramate   Week 1: One tablet (25 mg) once a day   Week 2: Two tablets (50 mg) once a day   Week 3: Three tablets (75 mg) once a day    Week 4: Four tablets (100mg) once a day. Stay at four tablets (100 mg) until you are seen again.      NEVER stop topiramate suddenly. Your medical provider will tell you how to slowly come off this medicine if needed.  NEVER take topiramate with alcohol     Phentermine   Phentermine is usually taken as a single daily dose in the morning with or without food.    Do not take a larger dose, take it more often, or take it for a longer period than your doctor tells you to. Do not drink alcohol while on phentermine.      Are these medications safe?    Topiramate   Although topiramate alone is not approved by the FDA for weight loss, it is used commonly in weight management clinics because of its effects on appetite.  It is also approved for children as young as 2 years old for other reasons such as seizures and migraines.     Most people tolerate topiramate with no problems. Please tell your medical provider if you have a history of kidney stones, if you are taking phenytoin (brand name: Dilantin) or birth control pills, or if you are pregnant. Topiramate is harmful in pregnancy. Topiramate can decrease your ability to tolerate hot weather. You should be sure to drink plenty  of water to prevent dehydration and kidney stones. Your medical provider will also check for possible interactions between your usual medications and topiramate.      One of the dangers of topiramate is the possibility of birth defects. If you get pregnant when you are taking topiramate, there is the risk that your baby will be born with a cleft lip or palate. If you are on topiramate and are of child bearing age, you must be on a reliable form of birth control or refrain from sex. Birth control pills may not work as effectively while taking topiramate.     Phentermine   Phentermine is not FDA approved for use in children or adolescents under 16 years of age by itself. You should not take phentermine if you have high blood pressure, heart disease, hyperthyroidism (overactive thyroid gland), glaucoma, if you are taking stimulant ADHD medications, if you have struggled with drug abuse, or if you are pregnant. Your medical provider will also check for possible interactions between your usual medications and phentermine.     What are the side effects?    Call your doctor right away if you notice any of the starred side effects:      Topiramate   Change in mood, especially depression or thoughts of suicide*     Severe pain in your sides, back, or groin (which may indicate a kidney stone)*   Sudden change in vision or eye pain*   New severe rash*     Phentermine   Chest pain*   Shortness of breath*    Difficulty doing exercises that you had been able to do before*    Swelling of the legs or ankles*    Severe restlessness, anxiety, or dizziness*    Increased blood pressure or heart rate       If you notice these less serious side effects, talk with your medical provider:     Topiramate   Mental fogginess, trouble concentrating, memory problems   Numbness or tingling in your hands or feet   Fatigue   New overheating   Nausea, vomiting, diarrhea or constipation     Phentermine   Trouble sleeping    Diarrhea or constipation     Dry mouth      How much does it cost?    Topiramate: $5 per month   Phentermine: $20-30/month. Currently, phentermine is not covered by insurance      If the cost is significantly more than this when you  either medication, please call us.      (Developed by: [facility] & [facility]- [id number])   GLP-1 Receptor Agonists (examples: liraglutide and semaglutide)     What are these medicines used for?    These medications were first developed to treat diabetes but have also been shown to have a significant effect of weight loss.      How do they work?    They work by affecting a hormone in your body that leads to decreased appetite, decreased food intake, and decreased rate that the stomach empties into the small intestine.       These medications may help you:   Feel less hungry   Lower food cravings   Increase your feeling of control around eating habits       Like all weight loss medications, these medications work best in combination with healthy nutrition, physical activity, and sleep.        How should I take these medications?    These medications are given by a small injection under the skin (“subcutaneous”) either once a day or once a week. The usual dosing is listed below, but please follow your medical provider's instructions for your specific plan.        Liraglutide (brand name: Saxenda), subcutaneous, daily    Week 1: 0.6mg every day   Week 2: 1.2mg every day   Week 3: 1.8mg every day   Week 4: 2.4mg every day   Week 5 and beyond: 3.0mg every day or maximum tolerated dose     Note: Daily dose may be split between pens. Please discuss this with your medical team or healthcare provider     Please go to the "[employer] for instructions and a video regarding the technique to give yourself injections:   [email or web address]     Semaglutude (brand name: Wegovy), subcutaneous, weekly    Week 1-4:  0.25mg once weekly   Week 5-8: 0.5mg once weekly   Week 9-12: 1mg once weekly   Week 13-16: 1.7mg once weekly   Week 17 and beyond: 2.4mg once weekly or maximum tolerated dose     Please go to the [employer] for instructions and a video regarding the technique to give yourself injections:   [email or web address]     Semaglutude (brand name: Ozempic), subcutaneous, weekly    Week 1-4: 0.25mg once weekly   Week 5-8: 0.5mg once weekly   Week 9-12: 1mg once weekly   Week 13-16: 2mg once weekly     Please go to the Ozempic for instructions and a video regarding the technique to give yourself injections:   [email or web address]     What if I miss a dose?   Liraglutide   If a dose is missed, restart the next day. An extra dose or increase in dose should not be taken to make up for the missed dose.    If more than 3 days have passed since the last dose, please contact your health care provider about how to restart the medication      Semaglutide:    If you miss a dose, and your next dose is more than 2 days (48 hours) away, take the missed dose when you remember   If you miss a dose, and the next scheduled dose is less than 2 days away (48 hours), do not give the dose. Take your next dose on the regularly scheduled day.   If you miss 2 or more doses, take the next dose on the regularly scheduled day or call your health care provider to talk about how to restart due to possible side effects     Storage   Liraglutide   Store new, unused Saxenda®?pens in the refrigerator between 36°F and 46°F (2°C to 8°C).    After first use, store in a refrigerator or at room temperature between 59°F and 86°F (15°C to 30°C).    Pens in use should be thrown away after 30 days even if they still have Saxenda®?left in them.    Don't freeze Saxenda®. Saxenda®?that has been frozen must not be used.       Semaglutide:    Store the WEGOVY single-dose pen in the refrigerator from 36°F to 46°F (2°C to 8°C).    If needed, prior to taking off the cap, the pen can be  kept from 46°F to 86°F (8°C to 30°C) up to 28 days.    Do not freeze.      Protect WEGOVY from light. WEGOVY must be kept in the original carton until its time to inject.    Discard the WEGOVYpen after use.     Ozempic:   Store your new, unused Ozempic®?pens?in the refrigerator between 36°F to 46°F (2°C to 8°C).    Store your pen in use for 56 days at room temperature between 59ºF to 86ºF (15ºC to 30ºC) or in a refrigerator between 36°F to 46°F (2°C to 8°C).    Discard after 56 days.     Are these medications safe?    For weight loss, both liraglutide and semaglutide are FDA approved for age 12 years and older. This class of medication is also approved for people who have diabetes. As with any medication, there may be side effects. More serious things that can happen include allergic reactions, thyroid tumors, pancreatitis, gallbladder problems, kidney problems, and worsened depression.       You should not take these medications if you think you may be pregnant or are planning to become pregnant. You should not take these medications if you or a family member has medullary thyroid carcinoma or if you have multiple endocrine neoplasia type 2.       What are the possible side effects?    If you notice these common, but less serious side effects, talk with your medical provider:   Abdominal pain, nausea, vomiting, heartburn, diarrhea, constipation   Headache   Tiredness   Dizziness    Reaction at the injection site   Low blood sugar (if taking this medication with certain diabetes medications)   Increased heart rate       Call your doctor right away if you notice any of the following less common side effects:    Lump or swelling in your neck, hoarseness, trouble swallowing or shortness of breath (could be related to a new thyroid problem)   Severe abdominal pain, especially if it travels to the back (possible signs of pancreatitis)   Abdominal pain (especially in your upper stomach) with fever, yellowing of the skin  or eyes or david-colored stools (possible gallbladder problem)   If you develop rash, facial swelling, and/or trouble breathing (allergic reaction), call your medical provider or if severe, call 911     How much does it cost?    The out of pocket cost varies by insurance, but if you are asked to pay >$50 per month, please call us before filling the prescription. You can visit the following websites to see if you qualify for a medication savings card.     [employer]: https://[email or web address]/savings-card.html   Saxenda: https://www.[employer]/   Ozempic: https://[email or web address]/ozempic/savings-card.[email or web address]?[email or web address]       (Developed by: [facility])        Tirzepatide (GLP 1RA/GIP Medication)     What are these medicines used for?    This medication was first developed to treat diabetes but have also been shown to have a significant effect of weight loss.           How do they work?    They work by affecting hormones in your body that leads to decreased appetite, decreased food intake, and decreased rate that the stomach empties into the small intestine.       These medications may help you:   Reduce food intake   Feel mai with food intake   Increase your feeling of control around eating habits        Like all weight loss medications, these medications work best in combination with healthy nutrition, physical activity, and sleep efforts.      How should I take these medications?      This medication is given by a small injection under the skin once a week   Weeks 1-4: 2.5mg once weekly   Weeks 5-8: 5mg once weekly   Weeks 9-12: 7.5mg once weekly   Weeks 13-16: 10mg once weekly   Weeks 17-20: 12.5 mg once weekly   Weeks 21-24: 15mg once weekly      What if I miss a dose?   If you miss a dose, take the missed dose as soon as possible within 4 days (96 hours) after the missed dose.   If more than 4 days have passed, skip the missed dose and take your next dose on the  regularly scheduled day.?   Do not?take?2?doses within?3?days of each other.     Storage   Store in the refrigerator between 36?F to 46?F (2?C to 8?C). Store in the original carton until use to protect it from light.   If needed, each single-dose pen can be stored at room temperature up to 86?F (30?C) for up to 21 days.   Do not freeze and do not use if frozen     Are these medications safe?    This class of medication is approved for adults who have diabetes. As with any medication, there may be side effects. More serious things that can happen include allergic reactions, thyroid tumors, pancreatitis, gallbladder problems, kidney problems, and worsened depression.       If you are taking other by mouth medications, discuss with your doctor because tirzepatide can change the way your other medications are absorbed by your body.      If you are taking birth control pills, this medication may decrease the effectiveness of your medication. You should use a different method of birth control or add a barrier method (ex.condoms) for 4 weeks after starting this medication and for 4 weeks after each dose increase     You should not take these medications if you think you may be pregnant or are planning to become pregnant. You should not take these medications if you or a family member has medullary thyroid carcinoma or if you have multiple endocrine neoplasia type 2.       What are the possible side effects?    If you notice these common, but less serious side effects, talk with your medical provider:     Abdominal pain, nausea, vomiting, heartburn, diarrhea, constipation   Reaction at the injection site   Low blood sugar (only if taking this medication with certain diabetes medications)   Increase heart rate   Injection site reaction     Call your doctor right away if you notice any of the following less common side effects:    Lump or swelling in your neck, hoarseness, trouble swallowing or shortness of breath (could be  related to a thyroid problem)   Severe abdominal pain, especially if it travels to the back (possible signs of pancreatitis)   Abdominal pain (especially in your upper stomach) with fever, yellowing of the skin or eyes or david-colored stools (possible gallbladder problem)   Rash, facial swelling, and/or trouble breathing (allergic reaction)     How much does it cost?    The out of pocket cost does vary by insurance, but if it is >$50 per month, please call us before filling the prescription. You can visit this website to see if you qualify for a medication savings card.     (Developed by: [facility])

## 2024-04-11 NOTE — PROGRESS NOTES
No chief complaint on file.       HPI: Consuelo Treadwell is a 18 y.o. male with PMH obesity, elevated BP, ADHD, asthma [facility]-up of weight management and mood.    Last seen on 3/14 and Fluoxetine 20mg daily was started at that time for continued depression and passive SI (prior visit on 3/4 showed a positive PHQ and ASQ with passive SI, referred to counseling and SAFE-T completed at that time). Also started Hydroxyzine 25mg PRN particularly for nighttime to help with racing thoughts and to help fall asleep. Additionally during his 3/14 visit SMART goals were set for weight management and sleep hygiene:  Eating: Increase the number of days he eats breakfast each week to 3 days per week.  Exercise: 3 days per week walking the 8 city blocks   Sleep: 3 nights per week - unplug at 11:30 Pm and try to sleep    Interim history:     - Mood:   After starting the fluoxetine, he developed worsening mood and depression.  He also had worsening suicidal ideation to the point where dad confiscated his knives.  He endorsed being extremely angry/snappy and states that he was much more sarcastic than usual.  He was worried that he was having an interaction between his Singulair and the fluoxetine, so he reached out to Dr. Handy on 4/1 who advised him to stop the fluoxetine. He states that since stopping the fluoxetine on 4/1, his mood is back to baseline.  He still has passive SI but denies having a plan. He also has a strong support system and protective factors - identifies his friends and little sister has reasons to live.  Has been involved in counseling through [facility] and also has a psychiatry referral pending through them.  He says that he is back to square 1 with his mood but feels better being off of the fluoxetine.    He did use the Atarax about 5 times since the last visit which helped him sleep.  He states that it helped clear his racing thoughts and helped him get a good  night's sleep.  He has been taking it when he knows he has a full day the next day that he needs sleep for.  Otherwise, still continues to endorse poor sleep.  For example, last night he did not feel sleep until 3 AM and woke up at 9 AM, so he fell asleep on the bus ride here.  He has been able to stop using screens by 11:30 PM at least 2-3 times a week, but on the other nights he is playing video games or watching TV through the night.  He does not think that this is affecting his sleep since last week he was playing a video game and was able to fall asleep right away.  He has been trying to cut down on the TV use though given his past TV burned-out and he had to buy a new one.    - Exercise:   He still is walking his dog daily but these walks are less than 5 minutes.  They are supposed to walk the dog around 4 times a day but since the dog runs around the house, they have only been doing it about twice a day.  Has not been doiing any extra walking given that it is still cold outside and he does not want to be outside in this weather.  However, his sujatha recently had a stroke so he has been walking back and forth from sujatha's house to the store to run errands which has provided some extra activity.    - Eating:  Saw the dietician on 4/2. He has been able to eat breakfast around 3 times a week, but the other days he still generally only has 1 or 2 meals a day.  He states this is because there usually is nothing in the house to cook, he does not feel like cooking, or because he does not feel like going out to get fast food.  States he does not routinely snack in between meals. Did do x1 month of Vitamin D 50,000 units once weekly, but the pharmacy would not let him take out the other 2 months to finish the course.    24 hour meal history:  -- Breakfast: nothing  -- Lunch: sausage, over easy eggs, grapes (12PM)  -- Dinner: barbeque chicken bites - about 3-4x bites (2:50AM)  -- Snacks: nothing  -- Drinks: cup or  two of milk, 2-3 bottles of water    - Resource help:   [facility] to [facility] couple of visits ago but has had trouble scheduling an appointment given how many other doctors appointments he has.  Does have an appointment with the [facility] dietitian coming up next week however.  He is also running out of bus passes.  He picked up extra bus passes from [employer] at the last visit, but now he only has 3 left because he had to give some to mom.    - Other:  Has a cardiology appointment tomorrow to evaluate his intermittent chest pain.  He also has a sleep medicine appointment at the end of this month.      Past Medical History:   Past Medical History:   Diagnosis Date    ADHD 12/02/2015    Allergic rhinitis 12/27/2023    History of lead poisoning 12/22/2015      Past Surgical History: No past surgical history on file.   Medications:    Current Outpatient Medications   Medication Instructions    albuterol 90 mcg/actuation inhaler 2-4 puffs, inhalation, Every 4 hours PRN    ergocalciferol (VITAMIN D2) 1,250 mcg, oral, Once Weekly    FLUoxetine (PROZAC) 20 mg, oral, Daily    hydrOXYzine HCL (ATARAX) 25 mg, oral, Every 6 hours PRN    montelukast (SINGULAIR) 5 mg, oral, Daily      Allergies:   Allergies   Allergen Reactions    Cockroach Rash and Shortness of breath    Milk Containing Products (Dairy) Unknown    Shellfish Containing Products GI Upset    House Dust Rash      Immunizations:   Immunization History   Administered Date(s) Administered    DTaP HepB IPV combined vaccine, pedatric (PEDIARIX) 2004    DTaP vaccine, pediatric  (INFANRIX) 2004, 02/23/2005, 09/12/2005, 11/25/2008    Flu vaccine (IIV4), preservative free *Check age/dose* 12/08/2014, 12/02/2015, 03/04/2024    HPV 9-valent vaccine (GARDASIL 9) 12/02/2015    HPV, Quadrivalent 05/03/2017    Hepatitis A vaccine, pediatric/adolescent (HAVRIX, VAQTA) 10/17/2007, 04/18/2008    Hepatitis B vaccine, pediatric/adolescent (RECOMBIVAX,  "ENGERIX) 2004, 11/14/2005, 04/19/2010    HiB PRP-OMP conjugate vaccine, pediatric (PEDVAXHIB) 2004, 2004, 03/03/2005, 09/12/2005    Influenza, Unspecified 02/23/2008, 02/23/2009, 03/26/2009, 01/06/2011, 05/03/2011, 11/08/2012, 01/07/2014    Influenza, seasonal, injectable 09/25/2021, 11/28/2022    MMR vaccine, subcutaneous (MMR II) 09/12/2005, 11/25/2008    Meningococcal B, Unspecified 08/24/2021, 09/25/2021    Meningococcal MCV4P 12/02/2015, 08/24/2021    Pfizer COVID-19 vaccine, Fall 2023, 12 years and older, (30mcg/0.3mL) 03/04/2024    Pfizer Purple Cap SARS-CoV-2 09/17/2021, 10/12/2021    Pneumococcal Conjugate PCV 7 2004, 2004, 02/23/2005, 11/14/2005    Poliovirus vaccine, subcutaneous (IPOL) 2004, 02/23/2005, 11/25/2008    Tdap vaccine, age 7 year and older (BOOSTRIX, ADACEL) 12/02/2015    Varicella vaccine, subcutaneous (VARIVAX) 09/12/2005, 11/25/2008     Family History: denies family history pertinent to presenting problem  Family History   Problem Relation Name Age of Onset    Asthma Mother      Other (Other) Mother          Marijuana use    No Known Problems Sister      No Known Problems Half-Sister      No Known Problems Half-Sister         /75   Pulse 78   Temp 36.6 °C (97.9 °F)   Resp 16   Ht 1.707 m (5' 7.21\")   Wt 136 kg (300 lb 11.3 oz)   BMI 46.81 kg/m²     GEN: Awake, alert, NAD  HEENT: Normocephalic/atraumatic, nares patent, no conjunctivitis or eye discharge, MMM  CVS: RRR, S1 and S2 heard normally, no m/r/g, cap refill <2s  RESP: Lungs CTAB but difficult exam given body habitus  FEN/GI: Abdomen soft, NT, ND. BS+  MSK: Moves all extremities equally  SKIN: No rashes noted  NEURO: awake and alert, answers questions appropriately, PERRL        Assessment and Plan:   Consuelo Treadwell is a 18 y.o. male with history of obesity, ADHD, and Asthma [facility] and mood management. Last visit Fluoxetine 20mg and " Hydroxyzine PRN was started, but the Fluoxetine was stopped given worsening mood and SI. Will try to restart a different SSRI in the same class at a lower dose and follow-up in 2 weeks to assess his mood; black box warning given and patient will stop the medication if he develops these symptoms. If he does develop these symptoms again, will have him follow up with psychiatry (either through  or through [facility]) for further depression management.  Counseled that he should keep his [facility] psychiatry appointment no matter what and should continue counseling. His ongoing depressed mood is likely making lifestyle modifications difficult, so will focus on mood stabilization prior to weight management and reassess next visit. Detailed plan as follows:    #Depression  - Start Sertraline 25mg daily x1 week and then increase to 50mg daily; black box warning given and patient to call if he has these symptoms  - Continue counseling through [facility]  - Has psychiatry referral through [facility]  - If he continues to have worsening mood/SI while on the Sertraline, will defer further management to psychiatry    #Sleep disturbance  - Continue Atarax 25mg PRN nightly  - Sleep hygiene discussed    #Obesity/weight management  - Will defer for now given depressed mood and lack of motivation; plan to reassess at next visit  - Vitamin D 50,000 units once weekly for 2 more months - refilled  - Prior SMART goals:  1. Eating: Increase the number of days he eats breakfast each week to 3 days per week.  2. Exercise: 3 days per week walking the 8 city blocks   3. Sleep: 3 nights per week - unplug at 11:30 Pm and try to sleep    #Resources  - Has [facility] dietician appointment on 4/15  - Patient to go upstairs to [employer] after appointment to obtain more bus passes      Patient seen and discussed with Dr. Анна Rajput MD  Pediatrics PGY-3

## 2024-04-12 ENCOUNTER — OFFICE VISIT (OUTPATIENT)
Dept: PEDIATRIC CARDIOLOGY | Facility: HOSPITAL | Age: 19
End: 2024-04-12
Payer: COMMERCIAL

## 2024-04-12 ENCOUNTER — HOSPITAL ENCOUNTER (OUTPATIENT)
Dept: PEDIATRIC CARDIOLOGY | Facility: HOSPITAL | Age: 19
Discharge: HOME | End: 2024-04-12
Payer: COMMERCIAL

## 2024-04-12 VITALS
BODY MASS INDEX: 47.65 KG/M2 | DIASTOLIC BLOOD PRESSURE: 91 MMHG | HEIGHT: 67 IN | HEART RATE: 84 BPM | SYSTOLIC BLOOD PRESSURE: 137 MMHG | OXYGEN SATURATION: 96 % | WEIGHT: 303.57 LBS

## 2024-04-12 DIAGNOSIS — R07.9 CHEST PAIN, UNSPECIFIED TYPE: ICD-10-CM

## 2024-04-12 DIAGNOSIS — R03.0 ELEVATED BP WITHOUT DIAGNOSIS OF HYPERTENSION: Primary | ICD-10-CM

## 2024-04-12 DIAGNOSIS — R07.89 OTHER CHEST PAIN: ICD-10-CM

## 2024-04-12 LAB
ATRIAL RATE: 76 BPM
P AXIS: 0 DEGREES
P OFFSET: 189 MS
P ONSET: 143 MS
PR INTERVAL: 152 MS
Q ONSET: 219 MS
QRS COUNT: 12 BEATS
QRS DURATION: 82 MS
QT INTERVAL: 356 MS
QTC CALCULATION(BAZETT): 400 MS
QTC FREDERICIA: 385 MS
R AXIS: 83 DEGREES
T AXIS: 42 DEGREES
T OFFSET: 397 MS
VENTRICULAR RATE: 76 BPM

## 2024-04-12 PROCEDURE — 93010 ELECTROCARDIOGRAM REPORT: CPT | Performed by: STUDENT IN AN ORGANIZED HEALTH CARE EDUCATION/TRAINING PROGRAM

## 2024-04-12 PROCEDURE — 3008F BODY MASS INDEX DOCD: CPT | Performed by: STUDENT IN AN ORGANIZED HEALTH CARE EDUCATION/TRAINING PROGRAM

## 2024-04-12 PROCEDURE — 99213 OFFICE O/P EST LOW 20 MIN: CPT | Mod: 25 | Performed by: STUDENT IN AN ORGANIZED HEALTH CARE EDUCATION/TRAINING PROGRAM

## 2024-04-12 PROCEDURE — 99203 OFFICE O/P NEW LOW 30 MIN: CPT | Performed by: STUDENT IN AN ORGANIZED HEALTH CARE EDUCATION/TRAINING PROGRAM

## 2024-04-12 PROCEDURE — 93005 ELECTROCARDIOGRAM TRACING: CPT | Performed by: STUDENT IN AN ORGANIZED HEALTH CARE EDUCATION/TRAINING PROGRAM

## 2024-04-12 PROCEDURE — 93005 ELECTROCARDIOGRAM TRACING: CPT

## 2024-04-12 NOTE — LETTER
April 12, 2024     Carrie Handy MD  [id number] [facility]  Pediatrics  [facility] [address]    Patient: Consuelo Treadwell   YOB: 2005   Date of Visit: 4/12/2024       Dear Dr. Carrie Handy MD:    Thank you for referring Consuelo Treadwell to me for evaluation. Below are my notes for this consultation.  If you have questions, please do not hesitate to call me. I look forward to following your patient along with you.       Sincerely,     Donnie Acevedo,       CC: No Recipients  ______________________________________________________________________________________      [facility]: Division of Pediatric Cardiology  Outpatient Evaluation     Summary    Reason For Visit: Chest pain    Impression: The etiology of the chest pain is: musculoskeletal, and possibly anxiety.  Elevated blood pressure on multiple readings    Plan: The following tests will be obtained - we will call with results: Ambulatory blood pressure monitor.      Cardiac Restrictions No cardiac restrictions. May participate in physical education and organized sports.    Endocarditis Prophylaxis: Not indicated    Respiratory Syncytial Virus Prophylaxis: No cardiac indications    Other Cardiac Clearance No further cardiac evaluation required prior to planned procedures. Cardiac anesthesia not recommended.     Primary Care Provider: Carrie Handy MD    Consuelo Treadwell was seen at the request of Carrie Handy MD for a chief complaint of chest pain; a report with my findings is being sent via written or electronic means to the referring physician with my recommendations for treatment.    Accompanied by:  self  : Not required  Language: English   Presentation   Chief Complaint:   Chief Complaint   Patient presents with   • Chest Pain   • Obesity   • Hypertension     Presenting Concern: Consuelo is a 18 y.o. male with a history of obesity, asthma, and elevated blood pressure  who presents for an initial  Pediatric Cardiology evaluation due to the following concerns:  - Chest Pain: The pain was first noticed about 2 years ago, and since has been becoming more frequent. The pain is described as sharp stabbing, located in the center of chest, without radiation. It is associated with stress. The pain episodes typically occur 1 - 2 times a week, and last for 30 seconds to 1 minute. They tend to occur under stressful situations and are preceeded by a locking up feeling of his arms and legs. The pain is brief and does not worsens with movement or breathing. Specifically, the pain does not occur with activity and does not prevent them from doing activities.    He has a history of elevated blood pressure readings and obesity, without additional concerns.  Specifically, there is no report of palpitations, cyanosis, syncope or presyncope, unexplained dizziness, or exercise intolerance.     Current Outpatient Medications:   •  albuterol 90 mcg/actuation inhaler, Inhale 2-4 puffs every 4 hours if needed for wheezing (cough)., Disp: 18 g, Rfl: 3  •  [START ON 4/14/2024] ergocalciferol (Vitamin D2) 1.25 MG (92669 UT) capsule, Take 1 capsule (1,250 mcg) by mouth 1 (one) time per week. (Patient not taking: Reported on 4/12/2024 Do not start before April 14, 2024.), Disp: 8 capsule, Rfl: 0  •  FLUoxetine (PROzac) 20 mg tablet, Take 1 tablet (20 mg) by mouth once daily. (Patient not taking: Reported on 4/12/2024), Disp: 30 tablet, Rfl: 1  •  hydrOXYzine HCL (Atarax) 25 mg tablet, Take 1 tablet (25 mg) by mouth every 6 hours if needed for anxiety (Sleep) for up to 60 doses. (Patient not taking: Reported on 4/12/2024), Disp: 60 tablet, Rfl: 0  •  montelukast (Singulair) 5 mg chewable tablet, Chew 1 tablet (5 mg) once daily. (Patient not taking: Reported on 4/12/2024), Disp: 30 tablet, Rfl: 11  •  sertraline (Zoloft) 25 mg tablet, Take 1 tablet (25 mg) by mouth once daily for 7 days, THEN 2 tablets (50 mg) once daily for 21 days.  "(Patient not taking: Reported on 4/12/2024), Disp: 49 tablet, Rfl: 0    Review of Systems: Please refer to separate questionnaire which was obtained and reviewed as a part of this visit.  Medical History   Medical Conditions:  Patient Active Problem List   Diagnosis   • ADHD (attention deficit hyperactivity disorder)   • Asthma, chronic, mild persistent, uncomplicated ([facility])   • Depressive disorder   • Elevated BP without diagnosis of hypertension   • Oppositional defiant disorder   • Second hand tobacco smoke exposure   • Snoring   • Poor sleep hygiene   • Other chest pain   • BMI 45.0-49.9, adult (Multi)   • Daytime sleepiness   • Wears glasses   • Immunization due   • Food insecurity   • History of bruising easily   • Marijuana use   • Acanthosis nigricans   • Vitamin D deficiency     Past Surgeries:  History reviewed. No pertinent surgical history.    Allergies:  Cockroach, Milk containing products (dairy), Shellfish containing products, and House dust    Family History:  There is no known family history of congenital heart disease, arrhythmia or sudden cardiac death, cardiomyopathy, or familial dyslipidemia    family history includes Asthma in his mother; No Known Problems in his half-sister, half-sister, and sister; Other in his mother.    Social History:  Social History     Tobacco Use   • Smoking status: Unknown     Physical Examination   BP (!) 137/91 (BP Location: Right arm, Patient Position: Sitting)   Pulse 84   Ht 1.708 m (5' 7.24\")   Wt 138 kg (303 lb 9.2 oz)   BMI 47.20 kg/m²     General: Well-appearing and in no acute distress.  Head, Ears, Nose: Normocephalic, atraumatic. Normal facies.  Eyes: Sclera white. Pupils round and reactive.  Mouth, Neck: Mucous membranes moist. Grossly normal dentition for age.  Chest: No chest wall deformities.  Heart: Normal S1 and S2.  No systolic or diastolic murmurs. No rubs, clicks, or gallops.   Pulses 2+ in upper and lower extremities bilaterally. No " radial-femoral delay.  Lungs: Breathing comfortably without respiratory support. Good air entry bilaterally. No wheezes or crackles.  Abdomen: Soft, nontender, not distended. Normoactive bowel sounds. No hepatomegaly or splenomegaly. No hepatic bruit.  Extremities: No clubbing or edema. No deformities. Capillary refill 2 seconds.   Neurologic / Psychiatric: Facial and extremity movement symmetric. No gross deficits. Appropriate behavior for age  Results   Electrocardiogram (ECG):  An ECG was obtained today demonstrating:  Normal sinus rhythm at 76 beats per minute.  Regular axis for age.  Regular intervals for age.  msec, QTc 400 msec.  Nonspecific T wave flattening    Assessment & Plan   Consuelo is a 18 y.o. male with a history of obesity and asthma  who presents due to chest pain.  His blood pressure is elevated at today's visit, even on repeated measure, and as such we will evaluate further with an ambulatory blood pressure monitor.  Should this be diagnostic of hypertension, I plan to refer him to an adult cardiologist for further management.    His chest pain is most likely noncardiac in nature based on his description.  He also has a normal cardiac examination and electrocardiogram.  Most likely it is musculoskeletal in nature, and I do believe there is a component of anxiety that is worsening his symptoms.    Plan:  Testing requiring follow-up from today's visit: none  Cardiac medications: None  Diet recommendations: Regular  Follow-up: No routine Cardiology follow-up recommended at this time. Please return should any additional cardiac concerns arise.    This assessment and plan, in addition to the results of relevant testing were explained to Consuelo. All questions were answered, and understanding was demonstrated.     Donnie Acevedo DO, FAAP  Pediatric Cardiology

## 2024-04-12 NOTE — PATIENT INSTRUCTIONS
"Consuelo was seen by Cardiology (the heart doctors) today because of pain in his chest. After hearing the description of the pain, examining Consuelo, and reviewing his electrocardiogram (EKG), we are glad to say that his heart is not the cause of the chest pain, and is not related to the chest pain.    Fortunately, chest pain is caused by something other than the heart in about 99% of children and teenagers. Usually it is because of an issue with the muscles and bones of the chest, including inflammation of the joints between the ribs (costochondritis), or just because of a pulled or strained muscle. Children can sometimes have growing pains in their chest, just like other parts of their body. Motrin / Advil / ibuprofen may help with this type of chest pain, especially if it lasting for more than a few minutes, is predictable, or \"clusters\" a lot of times in a day or in a week.    Sometimes chest pain can be caused by heartburn (reflux or GERD) or event asthma. Chest pain is often made worse by anxiety, especially once someone thinks the pain in their chest is serious. Sometimes anxiety or a panic can be the cause of chest pain, although we like to make sure there are no other causes before assuming that is the cause.     The following tests were done today for Consuelo:    Examination: Normal  EKG: Normal     Follow-up with Cardiology: Only if needed if symptoms change or worsen  Restrictions related to Consuelo's heart: None  Consuelo does not need antibiotics before seeing the dentist     Please reach out to us if you have any questions or new concerns about Consuelo's heart, or what we spoke about at today's visit. You can call us at [phone], or send us a message through [employer].  "

## 2024-04-12 NOTE — PROGRESS NOTES
[facility]: Division of Pediatric Cardiology  Outpatient Evaluation     Summary    Reason For Visit: Chest pain    Impression: The etiology of the chest pain is: musculoskeletal, and possibly anxiety.  Elevated blood pressure on multiple readings    Plan: The following tests will be obtained - we will call with results: Ambulatory blood pressure monitor.      Cardiac Restrictions No cardiac restrictions. May participate in physical education and organized sports.    Endocarditis Prophylaxis: Not indicated    Respiratory Syncytial Virus Prophylaxis: No cardiac indications    Other Cardiac Clearance No further cardiac evaluation required prior to planned procedures. Cardiac anesthesia not recommended.     Primary Care Provider: Carrie Handy MD    Consuelo Treadwell was seen at the request of Carrie Hadny MD for a chief complaint of chest pain; a report with my findings is being sent via written or electronic means to the referring physician with my recommendations for treatment.    Accompanied by:  self  : Not required  Language: English   Presentation   Chief Complaint:   Chief Complaint   Patient presents with    Chest Pain    Obesity    Hypertension     Presenting Concern: Consuelo is a 18 y.o. male with a history of obesity, asthma, and elevated blood pressure  who presents for an initial Pediatric Cardiology evaluation due to the following concerns:  - Chest Pain: The pain was first noticed about 2 years ago, and since has been becoming more frequent. The pain is described as sharp stabbing, located in the center of chest, without radiation. It is associated with stress. The pain episodes typically occur 1 - 2 times a week, and last for 30 seconds to 1 minute. They tend to occur under stressful situations and are preceeded by a locking up feeling of his arms and legs. The pain is brief and does not worsens with movement or breathing. Specifically, the pain does not  occur with activity and does not prevent them from doing activities.    He has a history of elevated blood pressure readings and obesity, without additional concerns.  Specifically, there is no report of palpitations, cyanosis, syncope or presyncope, unexplained dizziness, or exercise intolerance.     Current Outpatient Medications:     albuterol 90 mcg/actuation inhaler, Inhale 2-4 puffs every 4 hours if needed for wheezing (cough)., Disp: 18 g, Rfl: 3    [START ON 4/14/2024] ergocalciferol (Vitamin D2) 1.25 MG (92356 UT) capsule, Take 1 capsule (1,250 mcg) by mouth 1 (one) time per week. (Patient not taking: Reported on 4/12/2024 Do not start before April 14, 2024.), Disp: 8 capsule, Rfl: 0    FLUoxetine (PROzac) 20 mg tablet, Take 1 tablet (20 mg) by mouth once daily. (Patient not taking: Reported on 4/12/2024), Disp: 30 tablet, Rfl: 1    hydrOXYzine HCL (Atarax) 25 mg tablet, Take 1 tablet (25 mg) by mouth every 6 hours if needed for anxiety (Sleep) for up to 60 doses. (Patient not taking: Reported on 4/12/2024), Disp: 60 tablet, Rfl: 0    montelukast (Singulair) 5 mg chewable tablet, Chew 1 tablet (5 mg) once daily. (Patient not taking: Reported on 4/12/2024), Disp: 30 tablet, Rfl: 11    sertraline (Zoloft) 25 mg tablet, Take 1 tablet (25 mg) by mouth once daily for 7 days, THEN 2 tablets (50 mg) once daily for 21 days. (Patient not taking: Reported on 4/12/2024), Disp: 49 tablet, Rfl: 0    Review of Systems: Please refer to separate questionnaire which was obtained and reviewed as a part of this visit.  Medical History   Medical Conditions:  Patient Active Problem List   Diagnosis    ADHD (attention deficit hyperactivity disorder)    Asthma, chronic, mild persistent, uncomplicated ([facility])    Depressive disorder    Elevated BP without diagnosis of hypertension    Oppositional defiant disorder    Second hand tobacco smoke exposure    Snoring    Poor sleep hygiene    Other chest pain    BMI 45.0-49.9, adult  "(Multi)    Daytime sleepiness    Wears glasses    Immunization due    Food insecurity    History of bruising easily    Marijuana use    Acanthosis nigricans    Vitamin D deficiency     Past Surgeries:  History reviewed. No pertinent surgical history.    Allergies:  Cockroach, Milk containing products (dairy), Shellfish containing products, and House dust    Family History:  There is no known family history of congenital heart disease, arrhythmia or sudden cardiac death, cardiomyopathy, or familial dyslipidemia    family history includes Asthma in his mother; No Known Problems in his half-sister, half-sister, and sister; Other in his mother.    Social History:  Social History     Tobacco Use    Smoking status: Unknown     Physical Examination   BP (!) 137/91 (BP Location: Right arm, Patient Position: Sitting)   Pulse 84   Ht 1.708 m (5' 7.24\")   Wt 138 kg (303 lb 9.2 oz)   BMI 47.20 kg/m²     General: Well-appearing and in no acute distress.  Head, Ears, Nose: Normocephalic, atraumatic. Normal facies.  Eyes: Sclera white. Pupils round and reactive.  Mouth, Neck: Mucous membranes moist. Grossly normal dentition for age.  Chest: No chest wall deformities.  Heart: Normal S1 and S2.  No systolic or diastolic murmurs. No rubs, clicks, or gallops.   Pulses 2+ in upper and lower extremities bilaterally. No radial-femoral delay.  Lungs: Breathing comfortably without respiratory support. Good air entry bilaterally. No wheezes or crackles.  Abdomen: Soft, nontender, not distended. Normoactive bowel sounds. No hepatomegaly or splenomegaly. No hepatic bruit.  Extremities: No clubbing or edema. No deformities. Capillary refill 2 seconds.   Neurologic / Psychiatric: Facial and extremity movement symmetric. No gross deficits. Appropriate behavior for age  Results   Electrocardiogram (ECG):  An ECG was obtained today demonstrating:  Normal sinus rhythm at 76 beats per minute.  Regular axis for age.  Regular intervals for age. [place] " 152 msec, QTc 400 msec.  Nonspecific T wave flattening    Assessment & Plan   Consuelo is a 18 y.o. male with a history of obesity and asthma  who presents due to chest pain.  His blood pressure is elevated at today's visit, even on repeated measure, and as such we will evaluate further with an ambulatory blood pressure monitor.  Should this be diagnostic of hypertension, I plan to refer him to an adult cardiologist for further management.    His chest pain is most likely noncardiac in nature based on his description.  He also has a normal cardiac examination and electrocardiogram.  Most likely it is musculoskeletal in nature, and I do believe there is a component of anxiety that is worsening his symptoms.    Plan:  Testing requiring follow-up from today's visit: none  Cardiac medications: None  Diet recommendations: Regular  Follow-up: No routine Cardiology follow-up recommended at this time. Please return should any additional cardiac concerns arise.    This assessment and plan, in addition to the results of relevant testing were explained to Consuelo. All questions were answered, and understanding was demonstrated.     Donnie Acevedo DO, FAAP  Pediatric Cardiology

## 2024-04-26 ENCOUNTER — CLINICAL SUPPORT (OUTPATIENT)
Dept: SLEEP MEDICINE | Facility: HOSPITAL | Age: 19
End: 2024-04-26
Payer: COMMERCIAL

## 2024-04-26 DIAGNOSIS — G47.33 OBSTRUCTIVE SLEEP APNEA (ADULT) (PEDIATRIC): ICD-10-CM

## 2024-04-26 DIAGNOSIS — J30.89 NON-SEASONAL ALLERGIC RHINITIS, UNSPECIFIED TRIGGER: ICD-10-CM

## 2024-04-26 DIAGNOSIS — R40.0 DAYTIME SLEEPINESS: ICD-10-CM

## 2024-04-26 PROCEDURE — 95810 POLYSOM 6/> YRS 4/> PARAM: CPT | Performed by: PSYCHIATRY & NEUROLOGY

## 2024-04-27 VITALS
WEIGHT: 304.45 LBS | HEIGHT: 67 IN | DIASTOLIC BLOOD PRESSURE: 93 MMHG | BODY MASS INDEX: 47.79 KG/M2 | SYSTOLIC BLOOD PRESSURE: 141 MMHG

## 2024-04-27 NOTE — PROGRESS NOTES
[facility] TECH NOTE:     Patient: Consuelo Treadwell   MRN//AGE: [id number]  2005  18 y.o.   Technologist: Lorena Leyva   Room: 1   Service Date: 24        Sleep Testing Location: [facility]:     TECHNOLOGIST SLEEP STUDY PROCEDURE NOTE:   This sleep study is being conducted according to the policies and procedures outlined by the AAS accreditation standards.  The sleep study procedure and processes involved during this appointment was explained to the patient/patient’s family, questions were answered. The patient/family verbalized understanding.      The patient is a 18 y.o. year old male scheduled for a Diagnostic  with montage of:  Peds PSG wih CO2 . he arrived for his appointment.      The study that was ultimately completed was a Diagnostic  with montage of: Peds PSG with CO2.    The full study Was completed.  Patient questionnaires completed?: yes     Consents signed? yes    Initial Fall Risk Screening:     Consuelo has not fallen in the last 6 months. his did not result in injury. Consuelo does not have a fear of falling. He does not need assistance with sitting, standing, or walking. he does not need assistance walking in his home. he does not need assistance in an unfamiliar setting. The patient is notusing an assistive device.     Brief Study observations: Rem was observed several times.     Deviation to order/protocol and reason: no      If PAP, which was preferred mask/pressure/mode: no      Other:Physician on call was contacted      After the procedure, the patient/family was informed to ensure followup with ordering clinician for testing results.      Technologist: Lorena Leyva

## 2024-05-01 ENCOUNTER — OFFICE VISIT (OUTPATIENT)
Dept: PEDIATRICS | Facility: CLINIC | Age: 19
End: 2024-05-01
Payer: COMMERCIAL

## 2024-05-01 ENCOUNTER — DOCUMENTATION (OUTPATIENT)
Dept: PEDIATRIC NEPHROLOGY | Facility: HOSPITAL | Age: 19
End: 2024-05-01
Payer: COMMERCIAL

## 2024-05-01 ENCOUNTER — CLINICAL SUPPORT (OUTPATIENT)
Dept: PEDIATRIC NEPHROLOGY | Facility: HOSPITAL | Age: 19
End: 2024-05-01
Payer: COMMERCIAL

## 2024-05-01 VITALS
WEIGHT: 304.7 LBS | SYSTOLIC BLOOD PRESSURE: 131 MMHG | BODY MASS INDEX: 47.82 KG/M2 | OXYGEN SATURATION: 96 % | HEIGHT: 67 IN | DIASTOLIC BLOOD PRESSURE: 84 MMHG | TEMPERATURE: 98.5 F | RESPIRATION RATE: 20 BRPM | HEART RATE: 67 BPM

## 2024-05-01 VITALS
SYSTOLIC BLOOD PRESSURE: 126 MMHG | BODY MASS INDEX: 47.47 KG/M2 | HEART RATE: 82 BPM | TEMPERATURE: 98.1 F | DIASTOLIC BLOOD PRESSURE: 72 MMHG | WEIGHT: 302.47 LBS | HEIGHT: 67 IN | RESPIRATION RATE: 20 BRPM

## 2024-05-01 DIAGNOSIS — E66.01 CLASS 3 OBESITY (MULTI): ICD-10-CM

## 2024-05-01 DIAGNOSIS — G47.30 SLEEP-DISORDERED BREATHING: ICD-10-CM

## 2024-05-01 DIAGNOSIS — F32.A DEPRESSIVE DISORDER: ICD-10-CM

## 2024-05-01 DIAGNOSIS — R03.0 ELEVATED BLOOD PRESSURE READING IN OFFICE WITHOUT DIAGNOSIS OF HYPERTENSION: ICD-10-CM

## 2024-05-01 DIAGNOSIS — R03.0 ELEVATED BP WITHOUT DIAGNOSIS OF HYPERTENSION: ICD-10-CM

## 2024-05-01 DIAGNOSIS — G47.33 OBSTRUCTIVE SLEEP APNEA SYNDROME: Primary | ICD-10-CM

## 2024-05-01 PROCEDURE — 99214 OFFICE O/P EST MOD 30 MIN: CPT | Mod: GC | Performed by: STUDENT IN AN ORGANIZED HEALTH CARE EDUCATION/TRAINING PROGRAM

## 2024-05-01 PROCEDURE — 99214 OFFICE O/P EST MOD 30 MIN: CPT | Performed by: STUDENT IN AN ORGANIZED HEALTH CARE EDUCATION/TRAINING PROGRAM

## 2024-05-01 PROCEDURE — 3008F BODY MASS INDEX DOCD: CPT | Performed by: STUDENT IN AN ORGANIZED HEALTH CARE EDUCATION/TRAINING PROGRAM

## 2024-05-01 ASSESSMENT — PAIN SCALES - GENERAL: PAINLEVEL: 0-NO PAIN

## 2024-05-01 NOTE — PROGRESS NOTES
Subjective   HPI:  Consuelo Treadwell is a 18 y.o. male presenting for follow up for   Chief Complaint   Patient presents with    Follow-up     HPI  Mood: Was planning to start Vit D and sertraline and dc'd fluoxetine at last visit but was unable to  due to costs. His mood is unchanged from last visit, not better and not worse. He is not sure when the last time he had SI was and he reports that he would not act on the thoughts that he has. He is working to set up regular follow up with [facility] and was able to talk to someone in the last few weeks. He is working on getting different insurance as he has healthcare related bills that are beginning to pile up.    Sleep: had sleep study in interim, diagnosed with mild ALLISON but likely would benefit from overnight CPAP. Moseley score 13/24. Sleeps around 12-1am and wakes up around 6-7am but sometimes will sleep longer. Has had trouble trying to sleep earlier as he has been unable to break the habit and put his phone/devices away earlier. Using atarax about 1 time a week.    Exercise: continues with dog walking. 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 Has not been able to start walking more as he is still waiting for the weather to get better.    Eating: has not been able to eat breakfast but is eating 3 meals a day starting with lunch -> other meal -> dinner. He gets dinner provided at work. Did see nutrition in the interim where portioning was discussed. He is attempting to portion control and is planning to get portioning bowls/plates. He is attempting to do this for all meals but is having some difficulties. He does have some interest in starting medications to help decrease appetite/make him feel more full.    Elevated BP: saw cards, picked up 24 hour BP monitor this morning. Getting a lot of errors initially as it did not  well. Bps cycled every 20 min while awake and every 30 min  "while asleep.    Past Medical History:   Diagnosis Date    ADHD 12/02/2015    Allergic rhinitis 12/27/2023    History of lead poisoning 12/22/2015     Objective      Visit Vitals  /72   Pulse 82   Temp 36.7 °C (98.1 °F)   Resp 20   Ht 1.705 m (5' 7.13\")   Wt 137 kg (302 lb 7.5 oz)   BMI 47.20 kg/m²   Smoking Status Unknown   BSA 2.55 m²      Physical Exam  Constitutional:       General: He is not in acute distress.     Appearance: Normal appearance. He is not ill-appearing.   HENT:      Head: Normocephalic.      Right Ear: Tympanic membrane, ear canal and external ear normal.      Left Ear: Tympanic membrane, ear canal and external ear normal.      Nose: Nose normal.      Mouth/Throat:      Mouth: Mucous membranes are moist.      Pharynx: Oropharynx is clear. No oropharyngeal exudate or posterior oropharyngeal erythema.   Eyes:      Extraocular Movements: Extraocular movements intact.      Conjunctiva/sclera: Conjunctivae normal.   Cardiovascular:      Rate and Rhythm: Normal rate and regular rhythm.      Heart sounds: No murmur heard.  Pulmonary:      Effort: Pulmonary effort is normal. No respiratory distress.      Breath sounds: Normal breath sounds.   Abdominal:      General: There is no distension.      Palpations: Abdomen is soft.      Tenderness: There is no abdominal tenderness.   Musculoskeletal:         General: No swelling. Normal range of motion.      Cervical back: Normal range of motion.   Skin:     General: Skin is warm.      Capillary Refill: Capillary refill takes less than 2 seconds.   Neurological:      General: No focal deficit present.      Mental Status: He is alert and oriented to person, place, and time. Mental status is at baseline.         Assessment      Consuelo Treadwell is a 18 y.o. male with obesity, ADHD, and asthma presenting for follow up of weight and mood management. He was unable to  medications for mood due to cost and does think he will be able to  these " medications in a few days. Discussed using GoodRx if needed and will follow up to assess mood on medication. Discussed, in brief, results of sleep study and placed referral for sleep medicine. Encouraged continual exercise and portioning as he is able to. Discussed medical options for weight management.    Plan    #depression  - start sertraline 25mg daily x 1 week then 50mg daily  - [facility] (has psych referral through [facility])    #ALLISON, mild  - continue atarax 25mg PRN nightly  - sleep medicine referral    #obesity/weight management  - encouraged portioning for all meals which is what patient desires, encouraged use of portioning bowls/plates  - vitamin D 50,000 U weekly  - discussed tirzepatide vs topiramate as possible medication options    Will follow up in 1 month.    Patient seen and discussed with Dr. Jj.    Yessy Lopez MD  PGY-1, Pediatrics

## 2024-05-01 NOTE — PROGRESS NOTES
I saw and evaluated the patient. I personally obtained the key and critical portions of the history and physical exam or was physically present for key and critical portions performed by the resident/fellow. I reviewed the resident/fellow's documentation and discussed the patient with the resident/fellow. I agree with the resident/fellow's medical decision making as documented in the note with the exception/addition of the following:    Acute issues:   He hasn't picked up the vitamin D or sertraline. Mood is stable. Denies SI and can't remember the last time.  He's using hydroxyzine 1x per week for sleep.  He's still engaged with [employer].     Not eating breakfast. He is eating 3 meals a day. 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  Challenges with     No results found.    Assessment/Plan:   1. Obstructive sleep apnea syndrome  2. Sleep-disordered breathing  3. Class 3 obesity (Multi)  4. BMI 45.0-49.9, adult (Multi)  5. Elevated BP without diagnosis of hypertension  - Referral to Adult Sleep Medicine; Future  - Reviewed use of anti-obesity medications for weight management.  - Start tirzepatide, weight loss, (Zepbound) 2.5 mg/0.5 mL injection; Inject 2.5 mg under the skin every 7 days.  Dispense: 4 each; Refill: 0  - Reviewed importance of continuing lifestyle modification.   -- Plans to use portioning bowls  -- continue to walk up and down the hill to and from work  -- Make appointment with sleep medicine    6. Depressive disorder  - Encouraged to  previously prescribed sertraline. Reports that he will have money to do so this week. Reviewed use of Good Rx, patient aware and has the leigh.        Kassandra Jj MD

## 2024-05-01 NOTE — PROGRESS NOTES
Consuelo Treadwell was accompanied by himself. He  was identified by name and birth date.  He was referred by Dr. Donnie Acevedo of pediatric cardiology.  The indication for this test is elevated blood pressure without diagnosis of hypertension. He  was fitted with an large adult cuff on his left nondominant arm.  His mid arm circumference was 40 cm. A test reading was obtained of 129/69 pulse of 88. I provided and reviewed home going information and answered all questions. He signed a promissory note to return the equipment at the conclusion of test period. A [employer] padded envelope postage paid was provided for equipment return. The family will be contacted by Dr. Acevedo's office  in the next 2 weeks with results and recommendations.

## 2024-05-01 NOTE — PATIENT INSTRUCTIONS
It was great to see you today, Quadell!    We talked about medicine for weight management. Remember, these medications don't work without doing the changes to nutrition and activity.  Your goals: 1) get the portioning plates/bowls to use to start to decrease portion sizes at meals. 2) continue to walk up and down the hill to and from work.    We referred you to sleep medicine to see about a CPAP machine to help with your breathing during sleep    Tirzepatide (GLP 1RA/GIP Medication)     What are these medicines used for?    This medication was first developed to treat diabetes but have also been shown to have a significant effect of weight loss.           How do they work?    They work by affecting hormones in your body that leads to decreased appetite, decreased food intake, and decreased rate that the stomach empties into the small intestine.       These medications may help you:   Reduce food intake   Feel mai with food intake   Increase your feeling of control around eating habits        Like all weight loss medications, these medications work best in combination with healthy nutrition, physical activity, and sleep efforts.      How should I take these medications?      This medication is given by a small injection under the skin once a week   Weeks 1-4: 2.5mg once weekly   Weeks 5-8: 5mg once weekly   Weeks 9-12: 7.5mg once weekly   Weeks 13-16: 10mg once weekly   Weeks 17-20: 12.5 mg once weekly   Weeks 21-24: 15mg once weekly      What if I miss a dose?   If you miss a dose, take the missed dose as soon as possible within 4 days (96 hours) after the missed dose.   If more than 4 days have passed, skip the missed dose and take your next dose on the regularly scheduled day.?   Do not?take?2?doses within?3?days of each other.     Storage   Store in the refrigerator between 36?F to 46?F (2?C to 8?C). Store in the original carton until use to protect it from light.   If needed, each single-dose pen can be  stored at room temperature up to 86?F (30?C) for up to 21 days.   Do not freeze and do not use if frozen     Are these medications safe?    This class of medication is approved for adults who have diabetes. As with any medication, there may be side effects. More serious things that can happen include allergic reactions, thyroid tumors, pancreatitis, gallbladder problems, kidney problems, and worsened depression.       If you are taking other by mouth medications, discuss with your doctor because tirzepatide can change the way your other medications are absorbed by your body.      If you are taking birth control pills, this medication may decrease the effectiveness of your medication. You should use a different method of birth control or add a barrier method (ex.condoms) for 4 weeks after starting this medication and for 4 weeks after each dose increase     You should not take these medications if you think you may be pregnant or are planning to become pregnant. You should not take these medications if you or a family member has medullary thyroid carcinoma or if you have multiple endocrine neoplasia type 2.       What are the possible side effects?    If you notice these common, but less serious side effects, talk with your medical provider:     Abdominal pain, nausea, vomiting, heartburn, diarrhea, constipation   Reaction at the injection site   Low blood sugar (only if taking this medication with certain diabetes medications)   Increase heart rate   Injection site reaction     Call your doctor right away if you notice any of the following less common side effects:    Lump or swelling in your neck, hoarseness, trouble swallowing or shortness of breath (could be related to a thyroid problem)   Severe abdominal pain, especially if it travels to the back (possible signs of pancreatitis)   Abdominal pain (especially in your upper stomach) with fever, yellowing of the skin or eyes or david-colored stools (possible  gallbladder problem)   Rash, facial swelling, and/or trouble breathing (allergic reaction)     How much does it cost?    The out of pocket cost does vary by insurance, but if it is >$50 per month, please call us before filling the prescription. You can visit this website to see if you qualify for a medication savings card.     (Developed by: [facility]’s [facility] Lifestyle Medicine Program)     GLP-1 Receptor Agonists (examples: liraglutide and semaglutide)     What are these medicines used for?    These medications were first developed to treat diabetes but have also been shown to have a significant effect of weight loss.      How do they work?    They work by affecting a hormone in your body that leads to decreased appetite, decreased food intake, and decreased rate that the stomach empties into the small intestine.       These medications may help you:   Feel less hungry   Lower food cravings   Increase your feeling of control around eating habits       Like all weight loss medications, these medications work best in combination with healthy nutrition, physical activity, and sleep.        How should I take these medications?    These medications are given by a small injection under the skin (“subcutaneous”) either once a day or once a week. The usual dosing is listed below, but please follow your medical provider's instructions for your specific plan.        Liraglutide (brand name: Saxenda), subcutaneous, daily    Week 1: 0.6mg every day   Week 2: 1.2mg every day   Week 3: 1.8mg every day   Week 4: 2.4mg every day   Week 5 and beyond: 3.0mg every day or maximum tolerated dose     Note: Daily dose may be split between pens. Please discuss this with your medical team or healthcare provider     Please go to the [email or web address] for instructions and a video regarding the technique to give yourself injections:   [email or web address].[email or web address]     Semaglutude (brand name: Wegovy), subcutaneous, weekly    Week 1-4: 0.25mg once weekly    Week 5-8: 0.5mg once weekly   Week 9-12: 1mg once weekly   Week 13-16: 1.7mg once weekly   Week 17 and beyond: 2.4mg once weekly or maximum tolerated dose     Please go to the [employer] for instructions and a video regarding the technique to give yourself injections:   [email or web address]     Semaglutude (brand name: Ozempic), subcutaneous, weekly    Week 1-4: 0.25mg once weekly   Week 5-8: 0.5mg once weekly   Week 9-12: 1mg once weekly   Week 13-16: 2mg once weekly     Please go to the Ozempic for instructions and a video regarding the technique to give yourself injections:   [email or web address]     What if I miss a dose?   Liraglutide   If a dose is missed, restart the next day. An extra dose or increase in dose should not be taken to make up for the missed dose.    If more than 3 days have passed since the last dose, please contact your health care provider about how to restart the medication      Semaglutide:    If you miss a dose, and your next dose is more than 2 days (48 hours) away, take the missed dose when you remember   If you miss a dose, and the next scheduled dose is less than 2 days away (48 hours), do not give the dose. Take your next dose on the regularly scheduled day.   If you miss 2 or more doses, take the next dose on the regularly scheduled day or call your health care provider to talk about how to restart due to possible side effects     Storage   Liraglutide   Store new, unused Saxenda®?pens in the refrigerator between 36°F and 46°F (2°C to 8°C).    After first use, store in a refrigerator or at room temperature between 59°F and 86°F (15°C to 30°C).    Pens in use should be thrown away after 30 days even if they still have Saxenda®?left in them.    Don't freeze Saxenda®. Saxenda®?that has been frozen must not be used.       Semaglutide:    Store the WEGOVY single-dose pen in the refrigerator from 36°F to 46°F (2°C to 8°C).    If needed, prior to taking off the cap, the pen can be kept from 46°F to 86°F  (8°C to 30°C) up to 28 days.    Do not freeze.      Protect WEGOVY from light. WEGOVY must be kept in the original carton until its time to inject.    Discard the WEGOVYpen after use.     Ozempic:   Store your new, unused Ozempic®?pens?in the refrigerator between 36°F to 46°F (2°C to 8°C).    Store your pen in use for 56 days at room temperature between 59ºF to 86ºF (15ºC to 30ºC) or in a refrigerator between 36°F to 46°F (2°C to 8°C).    Discard after 56 days.     Are these medications safe?    For weight loss, both liraglutide and semaglutide are FDA approved for age 12 years and older. This class of medication is also approved for people who have diabetes. As with any medication, there may be side effects. More serious things that can happen include allergic reactions, thyroid tumors, pancreatitis, gallbladder problems, kidney problems, and worsened depression.       You should not take these medications if you think you may be pregnant or are planning to become pregnant. You should not take these medications if you or a family member has medullary thyroid carcinoma or if you have multiple endocrine neoplasia type 2.       What are the possible side effects?    If you notice these common, but less serious side effects, talk with your medical provider:   Abdominal pain, nausea, vomiting, heartburn, diarrhea, constipation   Headache   Tiredness   Dizziness    Reaction at the injection site   Low blood sugar (if taking this medication with certain diabetes medications)   Increased heart rate       Call your doctor right away if you notice any of the following less common side effects:    Lump or swelling in your neck, hoarseness, trouble swallowing or shortness of breath (could be related to a new thyroid problem)   Severe abdominal pain, especially if it travels to the back (possible signs of pancreatitis)   Abdominal pain (especially in your upper stomach) with fever, yellowing of the skin or eyes or david-colored  stools (possible gallbladder problem)   If you develop rash, facial swelling, and/or trouble breathing (allergic reaction), call your medical provider or if severe, call 911     How much does it cost?    The out of pocket cost varies by insurance, but if you are asked to pay >$50 per month, please call us before filling the prescription. You can visit the following websites to see if you qualify for a medication savings card.     Vermillionvy: https://www.[employer]/Pred[employer]vy/savings-card.html   Saxenda: [email or web address]   Ozempic: https://www.[employer]/ozempic/savings-card.html?[email or web address]       (Developed by: [facility]'s [facility] Lifestyle Medicine Program)        Topiramate and Phentermine   What are these medicines used for?    Topiramate helps patients feel less hungry and feel full more quickly. It may also help patients decrease binge eating behaviors.     Phentermine is thought to work in the brain to decrease appetite.      Both medications are used in people who carry extra weight AND who are enrolled in a weight loss program that includes dietary, physical activity, and behavior changes.       How do they work?    Topiramate was first developed to treat seizures in children and migraine headaches in adults. It affects chemical messengers in the brain, but the exact way it works to change appetite is unknown.   Phentermine is thought to work in the brain to decrease appetite.      Topiramate and phentermine may help you:    Feel less interest in eating in between meals    Think less about food and eating    Feel full or satisfied sooner    Have an easier time eating less      Topiramate extended release in combination with phentermine has been FDA approved for weight loss in patients 12 and older (marketed as Qsymia)    For some patients, these medications work right away. They feel and think quite differently about food. Other patients don't feel much of a change but find that they lose  weight. Finally, some patients do not have these feelings and do not have improvements in weight. For these patients, medications may be stopped after 3 months.       Like all weight loss medications, these medications work best when you help it work. This means:    Drinking water instead of sugar sweetened drinks (e.g. regular soda, juice)   Removing tempting high calorie (“junk”) food from the house    Staying away from situations or people that trigger your food cravings    Eating your meals at a table with all screens off (TV, phone)   Keeping track of what you are eating and drinking     How should I take these medications?    Topiramate   Week 1: One tablet (25 mg) once a day   Week 2: Two tablets (50 mg) once a day   Week 3: Three tablets (75 mg) once a day    Week 4: Four tablets (100mg) once a day. Stay at four tablets (100 mg) until you are seen again.      NEVER stop topiramate suddenly. Your medical provider will tell you how to slowly come off this medicine if needed.  NEVER take topiramate with alcohol     Phentermine   Phentermine is usually taken as a single daily dose in the morning with or without food.    Do not take a larger dose, take it more often, or take it for a longer period than your doctor tells you to. Do not drink alcohol while on phentermine.      Are these medications safe?    Topiramate   Although topiramate alone is not approved by the FDA for weight loss, it is used commonly in weight management clinics because of its effects on appetite.  It is also approved for children as young as 2 years old for other reasons such as seizures and migraines.     Most people tolerate topiramate with no problems. Please tell your medical provider if you have a history of kidney stones, if you are taking phenytoin (brand name: Dilantin) or birth control pills, or if you are pregnant. Topiramate is harmful in pregnancy. Topiramate can decrease your ability to tolerate hot weather. You should be sure  to drink plenty of water to prevent dehydration and kidney stones. Your medical provider will also check for possible interactions between your usual medications and topiramate.      One of the dangers of topiramate is the possibility of birth defects. If you get pregnant when you are taking topiramate, there is the risk that your baby will be born with a cleft lip or palate. If you are on topiramate and are of child bearing age, you must be on a reliable form of birth control or refrain from sex. Birth control pills may not work as effectively while taking topiramate.     Phentermine   Phentermine is not FDA approved for use in children or adolescents under 16 years of age by itself. You should not take phentermine if you have high blood pressure, heart disease, hyperthyroidism (overactive thyroid gland), glaucoma, if you are taking stimulant ADHD medications, if you have struggled with drug abuse, or if you are pregnant. Your medical provider will also check for possible interactions between your usual medications and phentermine.     What are the side effects?    Call your doctor right away if you notice any of the starred side effects:      Topiramate   Change in mood, especially depression or thoughts of suicide*     Severe pain in your sides, back, or groin (which may indicate a kidney stone)*   Sudden change in vision or eye pain*   New severe rash*     Phentermine   Chest pain*   Shortness of breath*    Difficulty doing exercises that you had been able to do before*    Swelling of the legs or ankles*    Severe restlessness, anxiety, or dizziness*    Increased blood pressure or heart rate       If you notice these less serious side effects, talk with your medical provider:     Topiramate   Mental fogginess, trouble concentrating, memory problems   Numbness or tingling in your hands or feet   Fatigue   New overheating   Nausea, vomiting, diarrhea or constipation     Phentermine   Trouble sleeping    Diarrhea  or constipation    Dry mouth      How much does it cost?    Topiramate: $5 per month   Phentermine: $20-30/month. Currently, phentermine is not covered by insurance      If the cost is significantly more than this when you  either medication, please call us.      (Developed by: [facility]’s [facility] Lifestyle Medicine Program & The Weight [facility] at [facility]- [email or web address])

## 2024-05-02 ENCOUNTER — TELEPHONE (OUTPATIENT)
Dept: PEDIATRIC NEPHROLOGY | Facility: HOSPITAL | Age: 19
End: 2024-05-02
Payer: COMMERCIAL

## 2024-05-06 ENCOUNTER — OFFICE VISIT (OUTPATIENT)
Dept: PEDIATRICS | Facility: CLINIC | Age: 19
End: 2024-05-06
Payer: COMMERCIAL

## 2024-05-06 VITALS
HEART RATE: 77 BPM | HEIGHT: 67 IN | WEIGHT: 304.45 LBS | SYSTOLIC BLOOD PRESSURE: 123 MMHG | DIASTOLIC BLOOD PRESSURE: 74 MMHG | RESPIRATION RATE: 18 BRPM | BODY MASS INDEX: 47.79 KG/M2 | TEMPERATURE: 97.3 F

## 2024-05-06 DIAGNOSIS — E55.9 VITAMIN D DEFICIENCY: ICD-10-CM

## 2024-05-06 DIAGNOSIS — J45.30 ASTHMA, CHRONIC, MILD PERSISTENT, UNCOMPLICATED (HHS-HCC): Primary | ICD-10-CM

## 2024-05-06 DIAGNOSIS — F12.90 MARIJUANA USE: ICD-10-CM

## 2024-05-06 DIAGNOSIS — G47.33 OBSTRUCTIVE SLEEP APNEA SYNDROME: ICD-10-CM

## 2024-05-06 DIAGNOSIS — E66.01 CLASS 3 OBESITY (MULTI): ICD-10-CM

## 2024-05-06 DIAGNOSIS — R07.89 OTHER CHEST PAIN: ICD-10-CM

## 2024-05-06 DIAGNOSIS — F32.A DEPRESSIVE DISORDER: ICD-10-CM

## 2024-05-06 PROCEDURE — 3008F BODY MASS INDEX DOCD: CPT | Performed by: PEDIATRICS

## 2024-05-06 PROCEDURE — 99214 OFFICE O/P EST MOD 30 MIN: CPT | Performed by: PEDIATRICS

## 2024-05-06 PROCEDURE — 99214 OFFICE O/P EST MOD 30 MIN: CPT | Mod: GC | Performed by: PEDIATRICS

## 2024-05-06 RX ORDER — MOMETASONE FUROATE AND FORMOTEROL FUMARATE DIHYDRATE 50; 5 UG/1; UG/1
2 AEROSOL RESPIRATORY (INHALATION)
Qty: 13 G | Refills: 2 | Status: SHIPPED | OUTPATIENT
Start: 2024-05-06

## 2024-05-06 RX ORDER — FLUTICASONE PROPIONATE 50 MCG
1 SPRAY, SUSPENSION (ML) NASAL DAILY
Qty: 16 G | Refills: 2 | Status: SHIPPED | OUTPATIENT
Start: 2024-05-06 | End: 2025-05-06

## 2024-05-06 RX ORDER — INHALER, ASSIST DEVICES
SPACER (EA) MISCELLANEOUS
Qty: 1 EACH | Refills: 0 | Status: SHIPPED | OUTPATIENT
Start: 2024-05-06

## 2024-05-06 NOTE — PATIENT INSTRUCTIONS
Thank you for seeing us today Quadell!     We made a few changes to your asthma medications:   - Use Dulera 2 puff twice a day every day WITH YOUR SPACER and you can use it as needed as well. The maximum number of puffs of Dulera per day is 12 so if you need more than that, please use albuterol 2-4 puffs every 4 hours and see a doctor as well!   - Let's try using flonase, 1 spray in each nostril every morning for your allergy symptoms.Continue singulair daily.  - Make sure you use the dulera and albuterol through a spacer so the medicine goes into your lungs!     Please  your zoloft ASAP. Use as directed - 25 mg daily for 5 days, then 50 mg (2 tabs) daily thereafter.    Healthy eating reminders:  Please decrease intake of sugared beverages.  Please eat three meals per day on a regular schedule.  Sleep 8-9 hours per night on a regular schedule.  We recommend walking for about 15 minutes after each major meal.  When snacking - take a portion from the container and put in a bowl and put the rest of the food away.  Do not use electronic screens while eating (or prior to sleeping) or you may miss your body's cues that you are full (or tired).  Please have a protein (meat/beans/nut butters) on 1/4 of your meal plate, a starch serving on 1/4 of the plate, and fruits or vegetables on 1/2 the plate. Have seconds of primarily the fruits/vegetables.  Please go outside 30 minutes per day as able.    You're doing a really good job handling all of your appointments, medications and working towards your goals! We will see you back in 6 weeks!

## 2024-05-06 NOTE — PROGRESS NOTES
Subjective   Patient ID: Consuelo Treadwell is a 18 y.o. male who presents for Follow-up visit.  HPI  Interim history on chronic illnesses:     Asthma --> worse with seasons changing, needs an allergy med rx, using albuterol daily twice a day due to exercise, taking Singulair daily     Depression --> has not picked up sertraline yet but you're going to be able to pick it up with next paycheck next Friday, had a moment yesterday where he had to vent to somebody but then felt better; passive SI at times but no plan or intent, no HI; lost therapist through [facility] so they're working on getting him a new one     Class 3 obesity --> has not picked up tirzepatide but will plan to as long as he can pay for it with next pay check; doing the walk up and down the hill from work; has not picked up portion bowls; nutrition wise he eats what is made at work    Sleep --> midnight/1 --> 7-9 AM; trying to turn off phone by 11:30, made appointment for sleep medicine, using atarax once every blue moon    Chest pain --> saw cardiologist on 4/12/24, normal exam and EKG, tried to do ABPM but monitor didn't work too well so he mailed it back and they will follow up     MJ use is less --> has cut back on edibles, probably once a month if that     Vitamin D deficiency --> has not picked up yet because he has to re-verify insurance     Objective   Physical Exam  Constitutional:       General: He is not in acute distress.  HENT:      Head: Normocephalic.      Mouth/Throat:      Mouth: Mucous membranes are moist.   Eyes:      Extraocular Movements: Extraocular movements intact.      Conjunctiva/sclera: Conjunctivae normal.      Pupils: Pupils are equal, round, and reactive to light.   Cardiovascular:      Rate and Rhythm: Normal rate and regular rhythm.      Heart sounds: No murmur heard.  Pulmonary:      Effort: Pulmonary effort is normal. No respiratory distress.      Breath sounds: Normal breath sounds.   Abdominal:      General:  Bowel sounds are normal. There is no distension.      Palpations: Abdomen is soft.   Musculoskeletal:         General: Normal range of motion.      Cervical back: Normal range of motion.   Skin:     General: Skin is warm.      Capillary Refill: Capillary refill takes less than 2 seconds.   Neurological:      Mental Status: He is alert.      Cranial Nerves: No cranial nerve deficit.       Assessment/Plan   Diagnoses and all for this visit:     18 year old M with class III obesity, depression, asthma and vitamin D deficiency presenting for follow up visit. Overall, he is working towards his nutrition and movement goals but has some cost restrictions to  his medications (zoloft, tirzepatide); will plan to pick them up this week after he gets his pay check.    Regarding his asthma, he has significant allergic symptoms so will trial flonase. He has daily albuterol use so will transition him to SMART therapy with Dulera.    Asthma, chronic, mild persistent, uncomplicated ([facility])  - Fluticasone (Flonase) 50 mcg/actuation nasal spray; Administer 1 spray into each nostril once daily. Shake gently. Before first use, prime pump. After use, clean tip and replace cap.  - Mometasone-formoterol (Dulera) 50-5 mcg/actuation HFA aerosol inhaler inhaler; Inhale 2 puffs 2 times a day and PRN for max 12 puffs     Class 3 obesity ([facility])  - Continue to work on goals from last time (portion bowls, walking up the hill to/from work)  - Will obtain tirzepatide at the end of this week    Obstructive sleep apnea syndrome  - Sleep medicine appointment scheduled for 7/17/24    Vitamin D deficiency  - Will  the Vitamin D this week    Marijuana use  - Has cut down significantly     Depressive disorder  - Patient to  sertraline this week    Other chest pain  - Saw cardiology, attempt ABPM but device ran out of battery, cardiology to analyze data and follow up with patient    F/U in 6 weeks     Seen and discussed with   [facility]     Ivon Tello MD 05/06/24 1:31 PM

## 2024-05-17 ENCOUNTER — APPOINTMENT (OUTPATIENT)
Dept: CARDIOLOGY | Facility: CLINIC | Age: 19
End: 2024-05-17
Payer: COMMERCIAL

## 2024-05-29 ENCOUNTER — APPOINTMENT (OUTPATIENT)
Dept: PEDIATRICS | Facility: CLINIC | Age: 19
End: 2024-05-29
Payer: COMMERCIAL

## 2024-06-14 ENCOUNTER — APPOINTMENT (OUTPATIENT)
Dept: PEDIATRICS | Facility: CLINIC | Age: 19
End: 2024-06-14
Payer: COMMERCIAL

## 2024-06-24 ENCOUNTER — OFFICE VISIT (OUTPATIENT)
Dept: PEDIATRICS | Facility: CLINIC | Age: 19
End: 2024-06-24
Payer: COMMERCIAL

## 2024-06-24 ENCOUNTER — SOCIAL WORK (OUTPATIENT)
Dept: PEDIATRICS | Facility: CLINIC | Age: 19
End: 2024-06-24
Payer: COMMERCIAL

## 2024-06-24 VITALS
BODY MASS INDEX: 49.44 KG/M2 | HEART RATE: 88 BPM | TEMPERATURE: 97.7 F | WEIGHT: 315 LBS | HEIGHT: 67 IN | SYSTOLIC BLOOD PRESSURE: 124 MMHG | RESPIRATION RATE: 20 BRPM | DIASTOLIC BLOOD PRESSURE: 77 MMHG

## 2024-06-24 DIAGNOSIS — F32.A DEPRESSIVE DISORDER: Primary | ICD-10-CM

## 2024-06-24 DIAGNOSIS — E55.9 VITAMIN D DEFICIENCY: ICD-10-CM

## 2024-06-24 DIAGNOSIS — F41.9 ANXIETY: ICD-10-CM

## 2024-06-24 DIAGNOSIS — J45.30 ASTHMA, CHRONIC, MILD PERSISTENT, UNCOMPLICATED (HHS-HCC): ICD-10-CM

## 2024-06-24 DIAGNOSIS — F12.90 MARIJUANA USE: ICD-10-CM

## 2024-06-24 DIAGNOSIS — R63.5 ABNORMAL WEIGHT GAIN: ICD-10-CM

## 2024-06-24 PROCEDURE — 99214 OFFICE O/P EST MOD 30 MIN: CPT | Performed by: PEDIATRICS

## 2024-06-24 PROCEDURE — 3008F BODY MASS INDEX DOCD: CPT | Performed by: PEDIATRICS

## 2024-06-24 PROCEDURE — 99214 OFFICE O/P EST MOD 30 MIN: CPT | Mod: GC | Performed by: PEDIATRICS

## 2024-06-24 RX ORDER — MOMETASONE FUROATE AND FORMOTEROL FUMARATE DIHYDRATE 50; 5 UG/1; UG/1
2 AEROSOL RESPIRATORY (INHALATION)
Qty: 13 G | Refills: 2 | Status: SHIPPED | OUTPATIENT
Start: 2024-06-24

## 2024-06-24 RX ORDER — SERTRALINE HYDROCHLORIDE 50 MG/1
TABLET, FILM COATED ORAL DAILY
Qty: 31 TABLET | Refills: 2 | Status: SHIPPED | OUTPATIENT
Start: 2024-06-24 | End: 2024-09-24

## 2024-06-24 RX ORDER — ERGOCALCIFEROL 1.25 MG/1
50000 CAPSULE ORAL
Qty: 10 CAPSULE | Refills: 0 | Status: SHIPPED | OUTPATIENT
Start: 2024-06-30 | End: 2024-09-02

## 2024-06-24 ASSESSMENT — PAIN SCALES - GENERAL: PAINLEVEL: 0-NO PAIN

## 2024-06-24 NOTE — PROGRESS NOTES
Medical student wes Henry     Consuelo Treadwell is a 18 year old male with asthma, elevated bmi, acanthosis nigricans, and depression who presents for followup. He has gained weight since his last visit.     Pt reports that he recently switched his insurance to [employer] from [facility] after experiencing coverage issues. He states that he was unable to fill his prescriptions for sertraline, vitamin d, tirzepatide, or dulera.    Consuelo states that his asthma causes shortness of breath with exercise and reports that he uses 6 puffs of his albuterol sulfate inhaler daily while he walks up a large hill on his way to work.     He endorses difficulty sleeping. Each night he falls asleep around 12a and wakes up naturally around 4a. He states that he then struggles to return to sleep for approx 1 hour before getting another 1-2 hours of sleep each night. He uses hydroxyzine before bed 1x q2 weeks prn.     Pt reports that he is eating 2 meals a day. 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     Consuelo described his anxiety and depression as both 7/10 in clinic today. He endorsed occasional nonconstant suicidal ideation without plan on questioning. (SW saw him this visit and performed a SAFE-T). He also notes occasional chest pain that he attributes to anxiety. He was previously seeing a therapist through [facility] but she was fired. He has not seen a new therapist since then but still has a  through the program and reports that they are attempting to link him with a new therapist.     He has reduced his marijuana intake over the past few months and denies smoking in the past 1.5 months. He denies cigarette, alcohol, or other drug use. He is not sexually active. He lives at home with family presently. He occasionally exercises by walking the family dog.     He denies headache, palpitations, stomach pain, N/V/D, muscle cramping, edema,  homicidal ideation.      Objective  PE scribed by medical student  Physical Exam  Constitutional:       General: He is not in acute distress.     Appearance: He is obese. He is not diaphoretic.   HENT:      Head: Atraumatic.      Nose: No congestion.   Eyes:      General: No scleral icterus.     Conjunctiva/sclera: Conjunctivae normal.      Pupils: Pupils are equal, round, and reactive to light.   Cardiovascular:      Rate and Rhythm: Normal rate.   Pulmonary:      Effort: Pulmonary effort is normal. No respiratory distress.   Abdominal:      General: There is no distension.      Palpations: Abdomen is soft.   Musculoskeletal:         General: No swelling, tenderness or deformity.   Skin:     General: Skin is warm and dry.      Findings: Lesion (right elbow) present.      Comments: + acanthosis nigricans on neck   Neurological:      General: No focal deficit present.      Mental Status: He is alert and oriented to person, place, and time.     Right elbow with a nickel-sized scrape    Vitals:  Temp:  [36.5 °C (97.7 °F)] 36.5 °C (97.7 °F)  Heart Rate:  [88] 88  Resp:  [20] 20  BP: (139)/(83) 139/83 retake in clinic: 127/73   @TMAX(24)@  Wt Readings from Last 3 Encounters:   06/24/24 144 kg (317 lb 7.4 oz) (>99%, Z= 3.25)*   05/06/24 138 kg (304 lb 7.3 oz) (>99%, Z= 3.12)*   05/01/24 137 kg (302 lb 7.5 oz) (>99%, Z= 3.10)*     * Growth percentiles are based on CDC (Boys, 2-20 Years) data.           Assessment/Plan   Consuelo is a 18 y.o. male with obesity, depression, possible sleep apnea, vitamin D deficiency here for followup. He recently changed insurance and reports that he has been unable to fill all his prescription medications.     Obesity with Abnormal Weight Gain:   - pt has gained 6kg since his previous apt 5/6/24  - we will ask Dr. Jj to reorder tirzepatide for pt   - counseled on importance of exercise and diet; please walk farther than to work and back  - continue to monitor     Vitamin D deficiency   -  reordered ergocalciferol   - plan for serum level after 3 months of therapy    Asthma:   - reordered dulera, counseled pt on taking 2 puffs every am and pm (SMART therapy)  - ordered albuterol sulfate for prn use in the meantime    Depression / SI   - pt reports occasional SI without a plan. Affect in clinic is not congruent.   - Pt has apt with  at [facility] to discuss finding a new therapist. He has also been provided with a [facility] referral today in clinic.   - Pt  met with social work who performed a SAFE-T evaluation indicating that pt is not an imminent risk to himself or other presently.   - reordered zoloft prescription and counseled pt on black box warnings.     RTC on 7/17/24 for followup. Will check on progress with medications.  Will ask team to reschedule with Dr. Jj.     Jeannette Chavez MS3       06/25/24 at 4:28 PM - Carrie Handy MD

## 2024-06-24 NOTE — PATIENT INSTRUCTIONS
Great to see you today!  Please reschedule with Dr. Jj!!    Healthy eating habits review:  Please decrease intake of sugared beverages.  Please eat three meals per day on a regular schedule.  Sleep 8-9 hours per night on a regular schedule.  We recommend walking for about 15 minutes after each major meal.  When snacking - take a portion from the container and put in a bowl and put the rest of the food away.  Do not use electronic screens while eating (or prior to sleeping) or you may miss your body's cues that you are full (or tired).  Please have a protein (meat/beans/nut butters) on 1/4 of your meal plate, a starch serving on 1/4 of the plate, and fruits or vegetables on 1/2 the plate. Have seconds of primarily the fruits/vegetables.  Please go outside 30 minutes per day as able.    Please start zoloft and vitamin D as directed. Black box warning discussed.  SMART therapy has been ordered.    Our Care Transitions team has seen you today. Please follow up on their recommendations.  Please follow up with us in 4 weeks to follow up on medication and weight management.  Have an awesome month!!!

## 2024-06-24 NOTE — PROGRESS NOTES
"Date Seen: 6/24/24     Medical Staff Referring: Dr. Joaquin     Doctor reason for referral:  x Counseling      Housing      Clothing     Food      Baby Needs     School     Legal   Transportation  Other SAFE T     Concerns presented by pt and family:  Pt is 18 year old seeking assistance in finding new mental health counselor.     SW assessment:   Pt was seen per request from  for mental health services and conduct a SAFE-T.  Discussed previous mental health services and pt goals/needs for counseling. Pt has  through  currently and a referral for [facility] for additional mental services was decided.     Per hx, SAFE T was completed. Pt denied and current or recent SI. Pt stated \"in comes in waves\" when \"stressed out\". Pt denied having intent, method or plan. Pt identified hobbies and interests being technology and niels. Identified supports being friends and both bio & step-father. Described having a job, not liking it, motivated to find different job opportunity. Pt   Based of pt presentation, under clinical impression pt is not an imminent risk to himself or others at this point in time.     Assessed patient for other needs. Pt was provided housing and  transportation resources packet.  Discussed [facility]  and provided information on the service. Pt was educated on [facility] and provider accompanied him there to get additional resources.        Follow up plan:       SW to make referral __x__  SW will check in at next pt exam ____  SW will contact family ____  Family will contact SW with any future needs __      Gay Kimble, [facility]  Behavioral Health Coordinator     "

## 2024-06-25 PROBLEM — F41.9 ANXIETY: Status: ACTIVE | Noted: 2024-06-25

## 2024-06-26 DIAGNOSIS — E66.01 CLASS 3 OBESITY (MULTI): ICD-10-CM

## 2024-06-26 DIAGNOSIS — G47.33 OBSTRUCTIVE SLEEP APNEA SYNDROME: ICD-10-CM

## 2024-06-26 DIAGNOSIS — R03.0 ELEVATED BP WITHOUT DIAGNOSIS OF HYPERTENSION: ICD-10-CM

## 2024-07-10 PROBLEM — Z72.821 POOR SLEEP HYGIENE: Status: RESOLVED | Noted: 2024-03-04 | Resolved: 2024-07-10

## 2025-06-11 ASSESSMENT — ANXIETY QUESTIONNAIRES
6. BECOMING EASILY ANNOYED OR IRRITABLE: MORE THAN HALF THE DAYS
7. FEELING AFRAID AS IF SOMETHING AWFUL MIGHT HAPPEN: NEARLY EVERY DAY
IF YOU CHECKED OFF ANY PROBLEMS ON THIS QUESTIONNAIRE, HOW DIFFICULT HAVE THESE PROBLEMS MADE IT FOR YOU TO DO YOUR WORK, TAKE CARE OF THINGS AT HOME, OR GET ALONG WITH OTHER PEOPLE: SOMEWHAT DIFFICULT
2. NOT BEING ABLE TO STOP OR CONTROL WORRYING: NEARLY EVERY DAY
3. WORRYING TOO MUCH ABOUT DIFFERENT THINGS: NEARLY EVERY DAY
5. BEING SO RESTLESS THAT IT IS HARD TO SIT STILL: NEARLY EVERY DAY
1. FEELING NERVOUS, ANXIOUS, OR ON EDGE: NEARLY EVERY DAY
4. TROUBLE RELAXING: NEARLY EVERY DAY
7. FEELING AFRAID AS IF SOMETHING AWFUL MIGHT HAPPEN: NEARLY EVERY DAY
3. WORRYING TOO MUCH ABOUT DIFFERENT THINGS: NEARLY EVERY DAY
6. BECOMING EASILY ANNOYED OR IRRITABLE: MORE THAN HALF THE DAYS
4. TROUBLE RELAXING: NEARLY EVERY DAY
1. FEELING NERVOUS, ANXIOUS, OR ON EDGE: NEARLY EVERY DAY
IF YOU CHECKED OFF ANY PROBLEMS ON THIS QUESTIONNAIRE, HOW DIFFICULT HAVE THESE PROBLEMS MADE IT FOR YOU TO DO YOUR WORK, TAKE CARE OF THINGS AT HOME, OR GET ALONG WITH OTHER PEOPLE: SOMEWHAT DIFFICULT
GAD7 TOTAL SCORE: 20
5. BEING SO RESTLESS THAT IT IS HARD TO SIT STILL: NEARLY EVERY DAY
2. NOT BEING ABLE TO STOP OR CONTROL WORRYING: NEARLY EVERY DAY

## 2025-06-18 ENCOUNTER — OFFICE VISIT (OUTPATIENT)
Dept: PEDIATRICS | Facility: CLINIC | Age: 20
End: 2025-06-18
Payer: COMMERCIAL

## 2025-06-18 VITALS
TEMPERATURE: 95.9 F | RESPIRATION RATE: 20 BRPM | DIASTOLIC BLOOD PRESSURE: 76 MMHG | SYSTOLIC BLOOD PRESSURE: 137 MMHG | HEIGHT: 67 IN | HEART RATE: 83 BPM | WEIGHT: 315 LBS | BODY MASS INDEX: 49.44 KG/M2

## 2025-06-18 DIAGNOSIS — F12.90 MARIJUANA USE: ICD-10-CM

## 2025-06-18 DIAGNOSIS — F41.9 ANXIETY: ICD-10-CM

## 2025-06-18 DIAGNOSIS — Z00.00 ADULT WELLNESS VISIT: Primary | ICD-10-CM

## 2025-06-18 DIAGNOSIS — F32.A DEPRESSIVE DISORDER: ICD-10-CM

## 2025-06-18 DIAGNOSIS — J45.30 ASTHMA, CHRONIC, MILD PERSISTENT, UNCOMPLICATED (HHS-HCC): ICD-10-CM

## 2025-06-18 DIAGNOSIS — E55.9 VITAMIN D DEFICIENCY: ICD-10-CM

## 2025-06-18 PROCEDURE — 96127 BRIEF EMOTIONAL/BEHAV ASSMT: CPT | Performed by: PEDIATRICS

## 2025-06-18 PROCEDURE — 99395 PREV VISIT EST AGE 18-39: CPT | Performed by: PEDIATRICS

## 2025-06-18 PROCEDURE — 99395 PREV VISIT EST AGE 18-39: CPT | Mod: GC | Performed by: PEDIATRICS

## 2025-06-18 PROCEDURE — 3008F BODY MASS INDEX DOCD: CPT | Performed by: PEDIATRICS

## 2025-06-18 RX ORDER — MOMETASONE FUROATE AND FORMOTEROL FUMARATE DIHYDRATE 50; 5 UG/1; UG/1
AEROSOL RESPIRATORY (INHALATION)
Qty: 13 G | Refills: 2 | Status: SHIPPED | OUTPATIENT
Start: 2025-06-18

## 2025-06-18 RX ORDER — SERTRALINE HYDROCHLORIDE 50 MG/1
TABLET, FILM COATED ORAL
Qty: 190 TABLET | Refills: 0 | Status: SHIPPED | OUTPATIENT
Start: 2025-06-18 | End: 2025-09-28

## 2025-06-18 ASSESSMENT — PATIENT HEALTH QUESTIONNAIRE - PHQ9
4. FEELING TIRED OR HAVING LITTLE ENERGY: NEARLY EVERY DAY
7. TROUBLE CONCENTRATING ON THINGS, SUCH AS READING THE NEWSPAPER OR WATCHING TELEVISION: NEARLY EVERY DAY
6. FEELING BAD ABOUT YOURSELF - OR THAT YOU ARE A FAILURE OR HAVE LET YOURSELF OR YOUR FAMILY DOWN: NEARLY EVERY DAY
9. THOUGHTS THAT YOU WOULD BE BETTER OFF DEAD, OR OF HURTING YOURSELF: NEARLY EVERY DAY
5. POOR APPETITE OR OVEREATING: NEARLY EVERY DAY
4. FEELING TIRED OR HAVING LITTLE ENERGY: NEARLY EVERY DAY
10. IF YOU CHECKED OFF ANY PROBLEMS, HOW DIFFICULT HAVE THESE PROBLEMS MADE IT FOR YOU TO DO YOUR WORK, TAKE CARE OF THINGS AT HOME, OR GET ALONG WITH OTHER PEOPLE: SOMEWHAT DIFFICULT
3. TROUBLE FALLING OR STAYING ASLEEP: NEARLY EVERY DAY
8. MOVING OR SPEAKING SO SLOWLY THAT OTHER PEOPLE COULD HAVE NOTICED. OR THE OPPOSITE, BEING SO FIGETY OR RESTLESS THAT YOU HAVE BEEN MOVING AROUND A LOT MORE THAN USUAL: NEARLY EVERY DAY
8. MOVING OR SPEAKING SO SLOWLY THAT OTHER PEOPLE COULD HAVE NOTICED. OR THE OPPOSITE - BEING SO FIDGETY OR RESTLESS THAT YOU HAVE BEEN MOVING AROUND A LOT MORE THAN USUAL: NEARLY EVERY DAY
1. LITTLE INTEREST OR PLEASURE IN DOING THINGS: NEARLY EVERY DAY
10. IF YOU CHECKED OFF ANY PROBLEMS, HOW DIFFICULT HAVE THESE PROBLEMS MADE IT FOR YOU TO DO YOUR WORK, TAKE CARE OF THINGS AT HOME, OR GET ALONG WITH OTHER PEOPLE: SOMEWHAT DIFFICULT
6. FEELING BAD ABOUT YOURSELF - OR THAT YOU ARE A FAILURE OR HAVE LET YOURSELF OR YOUR FAMILY DOWN: NEARLY EVERY DAY
SUM OF ALL RESPONSES TO PHQ9 QUESTIONS 1 & 2: 6
3. TROUBLE FALLING OR STAYING ASLEEP OR SLEEPING TOO MUCH: NEARLY EVERY DAY
9. THOUGHTS THAT YOU WOULD BE BETTER OFF DEAD, OR OF HURTING YOURSELF: NEARLY EVERY DAY
7. TROUBLE CONCENTRATING ON THINGS, SUCH AS READING THE NEWSPAPER OR WATCHING TELEVISION: NEARLY EVERY DAY
2. FEELING DOWN, DEPRESSED OR HOPELESS: NEARLY EVERY DAY
SUM OF ALL RESPONSES TO PHQ QUESTIONS 1-9: 27
2. FEELING DOWN, DEPRESSED OR HOPELESS: NEARLY EVERY DAY
1. LITTLE INTEREST OR PLEASURE IN DOING THINGS: NEARLY EVERY DAY
5. POOR APPETITE OR OVEREATING: NEARLY EVERY DAY

## 2025-06-18 ASSESSMENT — PAIN SCALES - GENERAL: PAINLEVEL_OUTOF10: 0-NO PAIN

## 2025-06-18 NOTE — PATIENT INSTRUCTIONS
Great to see you today, Quadell!     - Weight management: We will discuss with Dr. Jj regarding a new medication. We will schedule you for an appointment with her after your follow up.  - Depression/Anxiety: We will plan to restart Zoloft. Start with 50 mg for 14 days, then increase to 100 mg. Black box warning reviewed.   - Asthma: Please fill your Dulera prescription to treat your asthma with SMART therapy. Do 2 puffs twice daily. You can also use the Dulera 2 puffs as needed for maximum of 12 puffs daily. Always use with a spacer.     Healthy Eating Patterns:  Please decrease intake of sugared beverages.  Please eat three meals per day on a regular schedule.  Sleep 8-9 hours per night on a regular schedule.  We recommend walking for about 15 minutes after each major meal.  When snacking - take a portion from the container and put in a bowl and put the rest of the food away.  Do not use electronic screens while eating (or prior to sleeping) or you may miss your body's cues that you are full (or tired).  Please have a protein (meat/beans/nut butters) on 1/4 of your meal plate, a starch serving on 1/4 of the plate, and fruits or vegetables on 1/2 the plate. Have seconds of primarily the fruits/vegetables.  Please go outside 30 minutes per day as able.    Follow up in 1 month. We will discuss your Tdap booster at that visit.

## 2025-06-18 NOTE — PROGRESS NOTES
Patient ID: Consuelo is a 19 y.o. man with oppositional defiant disorder, ADHD, obesity with abnormal weight gain, vitamin D deficiency, asthma, and depression/SI who presents for a routine health maintenance visit.    Subjective   HPI:  Acute Concerns:  None    Interim Visits:  ED visit on 9/4/24 - right ankle pain, diagnosed with ankle strain    Obesity with abnormal weight gain:  - He was prescribed on Tirzepatide for weight, but the script was never filled  - Gained 6 kg from previous visit one year ago  - Diet: Eats 1-2 meals per day. Can't remember what he eats.   - Exercise: Walk 30 min-1 hour daily. No cardiac exercise.    Vitamin D deficiency:  - Prescribed ergocalciferol; not currently taking  - Last Vit D level of 20 on 3/4/2024     Asthma:  - Prescribed Dulera 2 puffs BID and albuterol PRN  - No daily symptoms in the summer, shortness of breath and wheezing with exercise, no nocturnal symptoms, no albuterol use in the last week  - Triggers: cold weather    Depression/SI:  - PHQ-9 27 and JAY-7 20 today in office  - Initiated on Zoloft at last visit    -- He tried it, but was having side effects, therefore stopped taking it; he later stated that the medication was fine, but he ran out of medication and did not get it refilled.   - He always has passive SI. He states his thoughts of wanting to be dead all always in the back of his head and he knows of 101 ways he could do it. But he states that he is aware that killing himself would not benefit anyone or himself, so he would never do it.  - He shares that he emotionally hurts himself by talking bad about himself, but does not physically hurt himself  - He was seeing a psychologist, but discontinued due to insurance    Nutrition: Above. Not drinking as much juice anymore.  Exercise: Above.  Elimination: No issues with urination or stooling.  Sleep: Sleeps 2-4 hours per night because he does not like to sleep. He feels that his mind is always awake. No trouble  "falling asleep or staying asleep. Snoring every night.  Dental: Sometimes brushes teeth. Does not floss. Last dentist appointment >1 year ago.    Home: Lives with Mom, Step-father, and younger sister. 2 cats and 1 dog in the home. Feels safe at home.  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 Does not like current job, so looking for a new job.  Activities: Hobbies include computer games. All friends are online.   Diet/Eating: No concerns about body image or weight. Cutting down portions due to weight. No binging or purging behaviors.   Drugs: He drinks alcohol once a month. He smokes marijuana and uses edibles - 3-4 times a week. He does not use tobacco or recreational drugs.   Sexuality: Uses him/her or they/them. Interested in both males or females (more towards males). Currently in polyamorous relationship (people he met online). Not currently in sexual activity. Never been sexually active.  Suicide/Depression: Above.  Safety: Wears seatbelt in the car. 2 guns in the home - he does not know where they are, but knows how to use them in the event of intruders. He also had lots of weapons in the home.      Objective   Visit Vitals  /76   Pulse 83   Temp 35.5 °C (95.9 °F)   Resp 20   Ht 1.71 m (5' 7.32\")   Wt (!) 151 kg (332 lb 7.3 oz)   BMI 51.57 kg/m²   Smoking Status Unknown   BSA 2.68 m²     Physical Exam  Constitutional:       General: He is not in acute distress.     Appearance: He is obese.   HENT:      Head: Normocephalic.      Right Ear: External ear normal.      Left Ear: External ear normal.      Nose: Nose normal.      Mouth/Throat:      Mouth: Mucous membranes are moist.      Tonsils: 2+ on the right. 2+ on the left.   Eyes:      Extraocular Movements: Extraocular movements intact.      Conjunctiva/sclera: Conjunctivae normal.      Pupils: Pupils are equal, round, and reactive to light.   Cardiovascular:      Rate and Rhythm: Normal rate and regular rhythm.      " Pulses: Normal pulses.      Heart sounds: Normal heart sounds. No murmur heard.  Pulmonary:      Effort: Pulmonary effort is normal. No respiratory distress.      Breath sounds: Normal breath sounds.   Abdominal:      General: Abdomen is flat. Bowel sounds are normal.      Palpations: Abdomen is soft.      Tenderness: There is no abdominal tenderness.   Genitourinary:     Penis: Normal.       Testes: Normal.      Comments: circ male - no external lesions  Skin:     General: Skin is warm and dry.      Capillary Refill: Capillary refill takes less than 2 seconds.   Neurological:      General: No focal deficit present.      Mental Status: He is alert and oriented to person, place, and time.   Psychiatric:         Mood and Affect: Mood normal.          Synopsis [facility] 6/18/2025 6/11/2025    09:34   JAY-7   Feeling nervous, anxious, or on edge  3   Not being able to stop or control worrying  3   Worrying too much about different things  3   Trouble relaxing  3   Being so restless that it is hard to sit still  3   Becoming easily annoyed or irritable  2   Feeling afraid as if something awful might happen  3   JAY-7 Total Score  20    PHQ 2/9   Little interest or pleasure in doing things Almost all    Feeling down, depressed, or hopeless Almost all    Patient Health Questionnaire-2 Score 6     Trouble falling or staying asleep, or sleeping too much Almost all    Feeling tired or having little energy Almost all    Poor appetite or overeating Almost all    Feeling bad about yourself - or that you are a failure or have let yourself or your family down Almost all    Trouble concentrating on things, such as reading the newspaper or watching television Almost all    Moving or speaking so slowly that other people could have noticed? Or the opposite - being so fidgety or restless that you have been moving around a lot more than usual. Almost all    Thoughts that you would be better off dead or hurting yourself in some way  Almost all    Patient Health Questionnaire-9 Score 27     ASQ   1. In the past few weeks, have you wished you were dead? N    2. In the past few weeks, have you felt that you or your family would be better off if you were dead? N    3. In the past week, have you been having thoughts about killing yourself? Y    4. Have you ever tried to kill yourself? N    5. Are you having thoughts of killing yourself right now? N    Calculated Risk Score Potential Risk         Patient-reported     Hearing Screening    500Hz 1000Hz 2000Hz 4000Hz 6000Hz   Right ear Pass Pass Pass Pass Pass   Left ear Pass Pass Pass Pass Pass   Vision Screening - Comments:: Wears glasses     Immunization History   Administered Date(s) Administered    COVID-19, mRNA, LNP-S, PF, 30 mcg/0.3 mL dose 09/17/2021, 10/12/2021    DTaP HepB IPV combined vaccine, pedatric (PEDIARIX) 2004    DTaP vaccine, pediatric  (INFANRIX) 2004, 02/23/2005, 09/12/2005, 11/25/2008    Flu vaccine (IIV4), preservative free *Check age/dose* 12/08/2014, 12/02/2015, 03/04/2024    HPV 9-valent vaccine (GARDASIL 9) 12/02/2015    HPV, Quadrivalent 05/03/2017    Hepatitis A vaccine, pediatric/adolescent (HAVRIX, VAQTA) 10/17/2007, 04/18/2008    Hepatitis B vaccine, 19 yrs and under (RECOMBIVAX, ENGERIX) 2004, 11/14/2005, 04/19/2010    HiB PRP-OMP conjugate vaccine, pediatric (PEDVAXHIB) 2004, 2004, 03/03/2005, 09/12/2005    Influenza, Unspecified 02/23/2008, 02/23/2009, 03/26/2009, 01/06/2011, 05/03/2011, 11/08/2012, 01/07/2014    Influenza, seasonal, injectable 09/25/2021, 11/28/2022    MMR vaccine, subcutaneous (MMR II) 09/12/2005, 11/25/2008    Meningococcal ACWY-D (Menactra) 4-valent conjugate vaccine 12/02/2015, 08/24/2021    Meningococcal B, Unspecified 08/24/2021, 09/25/2021    Pfizer COVID-19 vaccine, 12 years and older, (30mcg/0.3mL) (Comirnaty) 03/04/2024    Pneumococcal Conjugate PCV 7 2004, 2004, 02/23/2005, 11/14/2005     Poliovirus vaccine, subcutaneous (IPOL) 2004, 02/23/2005, 11/25/2008    Tdap vaccine, age 7 year and older (BOOSTRIX, ADACEL) 12/02/2015    Varicella vaccine, subcutaneous (VARIVAX) 09/12/2005, 11/25/2008     Assessment/Plan   Consuelo is a 19 y.o. man in overall good health.  Growth parameters are appropriate for age. BMI-for-age percentile places him in the Morbidly Obese category. He was prescribed Tirzepatide at last visit, but not filled due to insurance issues. Will schedule follow up appointment with Dr. Jj to discuss other weight management medication options.  For depression and anxiety, PHQ-9 and JAY-7 demonstrated high levels of depression and anxiety today. Passive SI endorsed today with no concrete plans. Discussed reinitiating Zoloft 50 mg daily for next 2 weeks, then up titrating to 100 mg daily. Black box warning reviewed.  For asthma, he endorses symptoms in the winter and with physical activity. Discussed reinitiating SMART therapy with Dulera and new script sent. Counseled to always use spacer with inhaler.  He is up-to-date on immunizations.  Needs Tdap - last dose 10 years ago.  Lab work is not indicated today. We will wait and obtain labs when he sees Dr. Jj.  Anticipatory guidance was given, and age appropriate safety topics were reviewed.  Follow-up in 1 month for weight management and depression, or sooner as needed for acute concerns.  Due for Tdap - will discuss at next visit.     Instructions for healthy eating and healthy sleep habits provided.     Diagnoses and all orders for this visit:  BMI 50.0-59.9, adult (Multi)  Marijuana use  Depressive disorder  -     sertraline (Zoloft) 50 mg tablet; Take 1 tablet (50 mg) by mouth once daily for 14 days, THEN 2 tablets (100 mg) once daily.  Anxiety  Asthma, chronic, mild persistent, uncomplicated ([facility])  -     mometasone-formoterol (Dulera) 50-5 mcg/actuation HFA aerosol inhaler inhaler; Inhale 2 puffs 2 times a day. May also inhale  2 puffs every 6 hours if needed (cough, wheezing, or shortness of breath).  Other orders  -     Follow Up In Pediatrics; Future    Follow up on 7/28/25 with Dr. Jj for weight management and Dr. Handy for depression/anxiety.    Patient seen and discussed with Dr. Handy.    Reynaldo Mujica MD  PGY-1 Pediatrics    I saw and evaluated the patient. I personally obtained the key and critical portions of the history and physical exam or was physically present for key and critical portions performed by the resident/fellow. I reviewed the resident/fellow's documentation, edited as needed and discussed the patient with the resident/fellow. I agree with the resident/fellow's medical decision making as documented in the note.     Problem List Items Addressed This Visit       Asthma, chronic, mild persistent, uncomplicated ([facility])    Relevant Medications    mometasone-formoterol (Dulera) 50-5 mcg/actuation HFA aerosol inhaler inhaler    Depressive disorder    Relevant Medications    sertraline (Zoloft) 50 mg tablet    BMI 50.0-59.9, adult (Multi)    Marijuana use    Vitamin D deficiency    Anxiety     Other Visit Diagnoses         Adult wellness visit    -  Primary           Patient Instructions   Great to see you today, Quadell!     - Weight management: We will discuss with Dr. Jj regarding a new medication. We will schedule you for an appointment with her after your follow up.  - Depression/Anxiety: We will plan to restart Zoloft. Start with 50 mg for 14 days, then increase to 100 mg. Black box warning reviewed.   - Asthma: Please fill your Dulera prescription to treat your asthma with SMART therapy. Do 2 puffs twice daily. You can also use the Dulera 2 puffs as needed for maximum of 12 puffs daily. Always use with a spacer.     Healthy Eating Patterns:  Please decrease intake of sugared beverages.  Please eat three meals per day on a regular schedule.  Sleep 8-9 hours per night on a regular schedule.  We  recommend walking for about 15 minutes after each major meal.  When snacking - take a portion from the container and put in a bowl and put the rest of the food away.  Do not use electronic screens while eating (or prior to sleeping) or you may miss your body's cues that you are full (or tired).  Please have a protein (meat/beans/nut butters) on 1/4 of your meal plate, a starch serving on 1/4 of the plate, and fruits or vegetables on 1/2 the plate. Have seconds of primarily the fruits/vegetables.  Please go outside 30 minutes per day as able.    Follow up in 1 month. We will discuss your Tdap booster at that visit.      Patient informed we see patients to age 25 yr.      06/18/25 at 9:23 PM - Carrie Handy MD

## 2025-07-28 ENCOUNTER — OFFICE VISIT (OUTPATIENT)
Dept: PEDIATRICS | Facility: CLINIC | Age: 20
End: 2025-07-28
Payer: COMMERCIAL

## 2025-07-28 VITALS
BODY MASS INDEX: 49.44 KG/M2 | HEIGHT: 67 IN | SYSTOLIC BLOOD PRESSURE: 144 MMHG | WEIGHT: 315 LBS | RESPIRATION RATE: 15 BRPM | DIASTOLIC BLOOD PRESSURE: 83 MMHG | TEMPERATURE: 97.7 F | HEART RATE: 76 BPM

## 2025-07-28 DIAGNOSIS — G47.33 OBSTRUCTIVE SLEEP APNEA SYNDROME: ICD-10-CM

## 2025-07-28 DIAGNOSIS — E66.813 CLASS 3 OBESITY: Primary | ICD-10-CM

## 2025-07-28 DIAGNOSIS — Z23 IMMUNIZATION DUE: ICD-10-CM

## 2025-07-28 DIAGNOSIS — F32.A DEPRESSIVE DISORDER: ICD-10-CM

## 2025-07-28 DIAGNOSIS — Z11.3 ROUTINE SCREENING FOR STI (SEXUALLY TRANSMITTED INFECTION): ICD-10-CM

## 2025-07-28 DIAGNOSIS — R03.0 ELEVATED BLOOD PRESSURE READING: ICD-10-CM

## 2025-07-28 DIAGNOSIS — E55.9 VITAMIN D DEFICIENCY: ICD-10-CM

## 2025-07-28 PROCEDURE — 90471 IMMUNIZATION ADMIN: CPT | Performed by: STUDENT IN AN ORGANIZED HEALTH CARE EDUCATION/TRAINING PROGRAM

## 2025-07-28 PROCEDURE — 90480 ADMN SARSCOV2 VAC 1/ONLY CMP: CPT | Performed by: STUDENT IN AN ORGANIZED HEALTH CARE EDUCATION/TRAINING PROGRAM

## 2025-07-28 PROCEDURE — 99215 OFFICE O/P EST HI 40 MIN: CPT | Performed by: STUDENT IN AN ORGANIZED HEALTH CARE EDUCATION/TRAINING PROGRAM

## 2025-07-28 PROCEDURE — 3008F BODY MASS INDEX DOCD: CPT | Performed by: STUDENT IN AN ORGANIZED HEALTH CARE EDUCATION/TRAINING PROGRAM

## 2025-07-28 PROCEDURE — 99215 OFFICE O/P EST HI 40 MIN: CPT | Mod: GC,25 | Performed by: STUDENT IN AN ORGANIZED HEALTH CARE EDUCATION/TRAINING PROGRAM

## 2025-07-28 RX ORDER — SERTRALINE HYDROCHLORIDE 100 MG/1
100 TABLET, FILM COATED ORAL DAILY
Qty: 30 TABLET | Refills: 3 | Status: SHIPPED | OUTPATIENT
Start: 2025-07-28 | End: 2025-11-25

## 2025-07-28 RX ORDER — TIRZEPATIDE 7.5 MG/.5ML
7.5 INJECTION, SOLUTION SUBCUTANEOUS
Qty: 2 ML | Refills: 1 | Status: SHIPPED | OUTPATIENT
Start: 2025-09-22

## 2025-07-28 RX ORDER — CHOLECALCIFEROL (VITAMIN D3) 50 MCG
50 TABLET ORAL DAILY
Qty: 30 TABLET | Refills: 11 | Status: SHIPPED | OUTPATIENT
Start: 2025-07-28 | End: 2026-07-28

## 2025-07-28 RX ORDER — TIRZEPATIDE 2.5 MG/.5ML
2.5 INJECTION, SOLUTION SUBCUTANEOUS
Qty: 2 ML | Refills: 1 | Status: SHIPPED | OUTPATIENT
Start: 2025-07-28

## 2025-07-28 RX ORDER — TIRZEPATIDE 5 MG/.5ML
5 INJECTION, SOLUTION SUBCUTANEOUS
Qty: 2 ML | Refills: 1 | Status: SHIPPED | OUTPATIENT
Start: 2025-08-25

## 2025-07-28 ASSESSMENT — ANXIETY QUESTIONNAIRES
2. NOT BEING ABLE TO STOP OR CONTROL WORRYING: MORE THAN HALF THE DAYS
7. FEELING AFRAID AS IF SOMETHING AWFUL MIGHT HAPPEN: NEARLY EVERY DAY
GAD7 TOTAL SCORE: 15
6. BECOMING EASILY ANNOYED OR IRRITABLE: MORE THAN HALF THE DAYS
4. TROUBLE RELAXING: MORE THAN HALF THE DAYS
3. WORRYING TOO MUCH ABOUT DIFFERENT THINGS: MORE THAN HALF THE DAYS
1. FEELING NERVOUS, ANXIOUS, OR ON EDGE: MORE THAN HALF THE DAYS
5. BEING SO RESTLESS THAT IT IS HARD TO SIT STILL: MORE THAN HALF THE DAYS
IF YOU CHECKED OFF ANY PROBLEMS ON THIS QUESTIONNAIRE, HOW DIFFICULT HAVE THESE PROBLEMS MADE IT FOR YOU TO DO YOUR WORK, TAKE CARE OF THINGS AT HOME, OR GET ALONG WITH OTHER PEOPLE: SOMEWHAT DIFFICULT

## 2025-07-28 ASSESSMENT — PATIENT HEALTH QUESTIONNAIRE - PHQ9
1. LITTLE INTEREST OR PLEASURE IN DOING THINGS: MORE THAN HALF THE DAYS
9. THOUGHTS THAT YOU WOULD BE BETTER OFF DEAD, OR OF HURTING YOURSELF: SEVERAL DAYS
5. POOR APPETITE OR OVEREATING: MORE THAN HALF THE DAYS
7. TROUBLE CONCENTRATING ON THINGS, SUCH AS READING THE NEWSPAPER OR WATCHING TELEVISION: MORE THAN HALF THE DAYS
SUM OF ALL RESPONSES TO PHQ9 QUESTIONS 1 AND 2: 4
4. FEELING TIRED OR HAVING LITTLE ENERGY: NEARLY EVERY DAY
6. FEELING BAD ABOUT YOURSELF - OR THAT YOU ARE A FAILURE OR HAVE LET YOURSELF OR YOUR FAMILY DOWN: NEARLY EVERY DAY
SUM OF ALL RESPONSES TO PHQ QUESTIONS 1-9: 20
8. MOVING OR SPEAKING SO SLOWLY THAT OTHER PEOPLE COULD HAVE NOTICED. OR THE OPPOSITE, BEING SO FIGETY OR RESTLESS THAT YOU HAVE BEEN MOVING AROUND A LOT MORE THAN USUAL: MORE THAN HALF THE DAYS
3. TROUBLE FALLING OR STAYING ASLEEP OR SLEEPING TOO MUCH: NEARLY EVERY DAY
2. FEELING DOWN, DEPRESSED OR HOPELESS: MORE THAN HALF THE DAYS

## 2025-07-28 ASSESSMENT — PAIN SCALES - GENERAL: PAINLEVEL_OUTOF10: 8

## 2025-07-28 NOTE — PROGRESS NOTES
"HPI:   Consuelo Treadwell is a 19 y.o. year old male patient with ADHD, vitamin D deficiency, asthma, and depression/anxiety here today for weight management visit and follow up regarding anxiety/depression. Last seen in clinic by Dr. Handy on 06/18/2025 for annual well check. At that visit, he was started on Zoloft 50 mg with instruction to increase to 100 mg after two weeks.     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; did not follow up in sleep clinic regarding potentially starting CPAP to help with sleep    Elimination: denies issues with constipation or urination    #Foot Injury:  - Patient reports he was doing yard work this morning wearing flip flops and stepped on dale nail from shingle  - short nail, majority of nail in flip flop  - immediately cleaned wound and rinsed off in the shower  - last tetanus shot appears to be in 2015    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; 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; applying to other jobs   Activities: walking bus stop to/from home/work; 20-30 minutes walking  Diet/Eating: food at job (\"made for old people\" \"fiber heavy\" 1-2 meals a day; no breakfast- walks up hill that is steep); drinks water throughout the day; drinks 1-2 cups of juice/day; drinks coffee/tea/soda at work   Substance Use: smoking marijuana, sometimes edible; 1-2x a week   Suicide/Mental Health: \"feels weird\" on Zoloft, denies GI side effects; increased to 100 mg which he is taking; does not take on days he is at home (Monday and Thursday) because he finds that work is stressful; no issues with getting medication; reports stressors around not liking work and family moving and him helping with logistics of moving and contributing to down payment; denies active or passive SI    PMHx: Medical History[1]   PSHx: Surgical History[2]   Allergies: RX Allergies[3]   Medications: " "sertraline 100 mg   Family Hx: Family History[4]   Visit Vitals  /83   Pulse 76   Temp 36.5 °C (97.7 °F) (Temporal)   Resp 15   Ht 1.699 m (5' 6.89\")   Wt (!) 150 kg (330 lb 14.6 oz)   BMI 52.00 kg/m²   Smoking Status Never   BSA 2.66 m²     Physical Exam  Vitals reviewed.   Constitutional:       General: He is awake. He is not in acute distress.     Appearance: He is not ill-appearing, toxic-appearing or diaphoretic.     Cardiovascular:      Rate and Rhythm: Normal rate and regular rhythm.   Pulmonary:      Effort: Pulmonary effort is normal.      Breath sounds: Normal breath sounds.     Neurological:      General: No focal deficit present.      Mental Status: He is alert and oriented to person, place, and time.     Psychiatric:         Mood and Affect: Mood is not depressed. Affect is not flat.         Behavior: Behavior is cooperative.         Thought Content: Thought content does not include suicidal ideation. Thought content does not include suicidal plan.         Assessment/Plan   Consuelo Treadwell is a 19 y.o. here today for follow up regarding weight management and mood after starting Zoloft. Weight is decreased 1 kg since last visit. Discussed goal setting in nutrition, sleep, and activity, patient not ready to set goals at this time. Discussed medication options for weight management, patient interested in trying to obtain GLP-1 agonist again. Will attempt to prescribe.     Patient reports feeling no significant change in mood but JAY-7 and PHQ-9 mildly improved from last visit. ASQ negative today. Emphasized importance of taking sertraline daily to see effect. Will prescribe 100 mg sertraline and follow up in 1 month to see if dose increase is needed.     Blood pressure elevated today in clinic at 144/83. BP has been elevated at prior visits (137/76 last month). Previously attempted to undergo 24 hour ambulatory BP monitoring and has been seen by cardiology but unable to complete monitoring due to " reported equipment issues. Will assess renal function with labs and monitor. Likely will improve with weight loss and addressing ALLISON.    Given injury with dale nail, plan on giving Tdap booster today. Patient also agreeable to receiving COVID booster.     For lab monitoring, given last labs obtained 1 year ago with weight gain and continued elevated BP, will obtain labs (CBC, CMP, HbA1c, lipid panel). Also obtaining screening labs, HIV and hepatitis C.     Will follow up in 1 month regarding mood and weight management.         7/28/2025 6/18/2025 6/11/2025    09:34 6/24/2024    13:00   JAY-7   Feeling nervous, anxious, or on edge 2  3    Not being able to stop or control worrying 2  3    Worrying too much about different things 2  3    Trouble relaxing 2  3    Being so restless that it is hard to sit still 2  3    Becoming easily annoyed or irritable 2  2    Feeling afraid as if something awful might happen 3  3    JAY-7 Total Score 15  20     PHQ 2/9   Little interest or pleasure in doing things Over half Almost all     Feeling down, depressed, or hopeless Over half Almost all     Patient Health Questionnaire-2 Score 4 6      Trouble falling or staying asleep, or sleeping too much Almost all Almost all     Feeling tired or having little energy Almost all Almost all     Poor appetite or overeating Over half Almost all     Feeling bad about yourself - or that you are a failure or have let yourself or your family down Almost all Almost all     Trouble concentrating on things, such as reading the newspaper or watching television Over half Almost all     Moving or speaking so slowly that other people could have noticed? Or the opposite - being so fidgety or restless that you have been moving around a lot more than usual. Over half Almost all     Thoughts that you would be better off dead or hurting yourself in some way Several days Almost all     Patient Health Questionnaire-9 Score 20 27      ASQ   1. In the past few  weeks, have you wished you were dead? N N  N   2. In the past few weeks, have you felt that you or your family would be better off if you were dead? N N  N   3. In the past week, have you been having thoughts about killing yourself? N Y  N   4. Have you ever tried to kill yourself? N N  N   5. Are you having thoughts of killing yourself right now? N N  N   Calculated Risk Score No intervention is necessary Potential Risk   No intervention is necessary       Patient-reported       Problem List Items Addressed This Visit           ICD-10-CM    Depressive disorder F32.A    Relevant Medications    sertraline (Zoloft) 100 mg tablet    tirzepatide, weight loss, (Zepbound) 2.5 mg/0.5 mL injection    tirzepatide, weight loss, (Zepbound) 5 mg/0.5 mL injection (Start on 8/25/2025)    tirzepatide, weight loss, (Zepbound) 7.5 mg/0.5 mL injection (Start on 9/22/2025)    BMI 50.0-59.9, adult (Multi) Z68.43    Vitamin D deficiency E55.9    Relevant Medications    cholecalciferol (Vitamin D-3) 50 mcg (2,000 units) tablet     Other Visit Diagnoses         Codes      Class 3 obesity    -  Primary E66.813    Relevant Medications    tirzepatide, weight loss, (Zepbound) 2.5 mg/0.5 mL injection    tirzepatide, weight loss, (Zepbound) 5 mg/0.5 mL injection (Start on 8/25/2025)    tirzepatide, weight loss, (Zepbound) 7.5 mg/0.5 mL injection (Start on 9/22/2025)    Other Relevant Orders    Lipid panel    Hemoglobin A1c    Comprehensive metabolic panel      Obstructive sleep apnea syndrome     G47.33    Relevant Medications    tirzepatide, weight loss, (Zepbound) 2.5 mg/0.5 mL injection    tirzepatide, weight loss, (Zepbound) 5 mg/0.5 mL injection (Start on 8/25/2025)    tirzepatide, weight loss, (Zepbound) 7.5 mg/0.5 mL injection (Start on 9/22/2025)    Other Relevant Orders    Referral to Adult Sleep Medicine    CBC      Elevated blood pressure reading     R03.0      Immunization due     Z23    Relevant Orders    Tdap vaccine, age 7 years  and older (Completed)    Pfizer COVID-19 vaccine, monovalent, age 12 years and older (30 mcg/0.3 mL) (Comirnaty) (Completed)      Routine screening for STI (sexually transmitted infection)     Z11.3    Relevant Orders    Hepatitis C Antibody    HIV 1/2 Antigen/Antibody Screen with Reflex to Confirmation    Syphilis Screen with Reflex            Cami Torres M.D.  Pediatrics Categorical Resident, PGY-2    Patient seen and discussed with Dr. Jj.           [1]   Past Medical History:  Diagnosis Date    ADHD 12/02/2015    ADHD (attention deficit hyperactivity disorder)     Allergic rhinitis 12/27/2023    Asthma     History of lead poisoning 12/22/2015    Obesity    [2] No past surgical history on file.  [3]   Allergies  Allergen Reactions    Cockroach Rash and Shortness of breath    Milk Containing Products (Dairy) Unknown    Shellfish Containing Products GI Upset    House Dust Rash   [4]   Family History  Problem Relation Name Age of Onset    Asthma Mother      Other (Other) Mother          Marijuana use    No Known Problems Sister      No Known Problems Half-Sister      No Known Problems Half-Sister

## 2025-07-28 NOTE — PROGRESS NOTES
I saw and evaluated the patient. I personally obtained the key and critical portions of the history and physical exam or was physically present for key and critical portions performed by the resident/fellow. I reviewed the resident/fellow's documentation and discussed the patient with the resident/fellow. I agree with the resident/fellow's medical decision making as documented in the note with the exception/addition of the following:    Acute issues:   He has had issues with his insurance. He hasn't been able to get tirzepatide. He thinks he is covered by [employer] at this time. He reports that the pharmacy is running his meds through [facility]    He hasn't really made any nutrition changes. He eats whatever the residents at the [facility] eats. Unable to specify. Drinks juice and coffee and soda.     He gets 8 hours of sleep a night. Still not feeling rested.     No exercise outside of walking to work. 20-30 minutes of walking to and from work plus 5-6 hours of being on feet for work.     Not taking vitamin D.     He is still interested in injectable medications.     He doesn't take the sertraline on the days that he's at home.     He is not willing to make changes to nutrition  - can't eat breakfast because he has to walk up a big hill to get to work  - can't cut down on juice and pop because they're the only thing keeping him sane at work  - can't recall what he's actually eating      Assessment/Plan:   Consuelo Treadwell is a 19 y.o. male with history of elevated BMI (BMI 52 kg/m2) c/b mild ALLISON (PSG 4/2024), elevated blood pressures, asthma, depression, anxiety and ADHD referred from his PCP, Dr. Carrie Handy, who presents for weight management follow up.     Of note, he has not been seen by this provider in over 1 year. He is pre-contemplative about making lifestyle changes at this time but would like to trial injectable medication. Despite being precontemplative, he has lost ~1kg since 6/18/25.    He  reports that he has [employer] insurance, and we discussed that we might be able to get approval until he turns 20 (in 1 month). He would like to try this.    We also briefly discussed by mouth medications including topiramate and phentermien. Reviewed that as his blood pressure is elevated, we may need to do additional work up prior to initiation of phentermine.     Dr. Handy initiated sertraline 6/18/25, and he has now uptitrated to 100mg    1. Class 3 obesity (Primary)  2. BMI 50.0-59.9, adult (Multi)  3. Obstructive sleep apnea syndrome  4. Elevated blood pressure reading  Consults:   - Referral to Adult Sleep Medicine; Future    Labs  - CBC; Future  - Lipid panel; Future  - Hemoglobin A1c; Future  - Comprehensive metabolic panel; Future    Meds: attempt to start tirzepatide. Information provided for topiramate/phentermine. MAYRA and PA faxed to [facility] today per patient report of insurnace coverage  - tirzepatide, weight loss, (Zepbound) 2.5 mg/0.5 mL injection; Inject 2.5 mg under the skin every 7 days.  Dispense: 2 mL; Refill: 1  - tirzepatide, weight loss, (Zepbound) 5 mg/0.5 mL injection; Inject 5 mg under the skin every 7 days. Do not fill before August 25, 2025.  Dispense: 2 mL; Refill: 1  - tirzepatide, weight loss, (Zepbound) 7.5 mg/0.5 mL injection; Inject 7.5 mg under the skin every 7 days. Do not fill before September 22, 2025.  Dispense: 2 mL; Refill: 1    Other  - consider 24 ABPM in the future if BP remains elevated      5. Vitamin D deficiency  - cholecalciferol (Vitamin D-3) 50 mcg (2,000 units) tablet; Take 1 tablet (50 mcg) by mouth once daily.  Dispense: 30 tablet; Refill: 11    6. Depressive disorder  -continue sertraline (Zoloft) 100 mg tablet; Take 1 tablet (100 mg) by mouth once daily.  Dispense: 30 tablet; Refill: 3    7. Immunization due  - Tdap vaccine, age 7 years and older --> stepped on dale nail  - Pfizer COVID-19 vaccine, monovalent, age 12 years and older (30 mcg/0.3 mL)  (Comirnaty)    8. Routine screening for STI (sexually transmitted infection)  - Hepatitis C Antibody; Future  - HIV 1/2 Antigen/Antibody Screen with Reflex to Confirmation; Future  - Syphilis Screen with Reflex; Future    Future Appointments   Date Time Provider Department Center   9/8/2025  2:00 PM Kassandra Jj MD [id number]     I spent 45 minutes in the professional and overall care of this patient on 07/28/2025 including Preparing to see the patient, Obtaining and/or reviewing separately obtained history, Performing a medically necessary and appropriate examination and/or evaluation , Counseling and educating the patient/family/caregiver, Ordering medications, tests or procedures, Referring and communicating with other health care professionals (when not reported separately) , Documenting clinical information in the electronic or other health record , and Care coordination (not reported separately)          Kassandra Jj MD

## 2025-07-28 NOTE — LETTER
Name: Consuelo Treadwell  : 2005    To whom it may concern:     I am writing on behalf of my patient Consuelo Treadwell (2005) to initiate prior authorization of Zepbound (tirzepatide subcutaneous injection).    I am an Adolescent Medicine and Obesity Medicine board certified physician in the Adolescent Medicine Clinic at [facility] and [facility], and I am caring for Consuelo Treadwell (2005). I am writing this letter of medical necessity to request insurance coverage for Zepbound at a dose of 15mg weekly after completing the FDA approved dose escalation schedule. This letter serves to document that Consuelo has severe, progressive obesity, requiring Zepbound, which is medically necessary. Zepbound will be used will be used under my supervision with regular monitoring and in addition to ongoing behavioral intervention for nutrition and activity modification.    Consuelo is a 19 y.o. year old treated in our Adolescent Medicine Clinic for severe obesity (BMI Body mass index is 52 kg/m².,). Consuelo's  medical history includes elevated blood pressure and obstructive sleep apnea.  Consuelo  has been a patient in our clinic since 3/2024 and has pursued attempts at lifestyle modification unsuccessfully.     The patient and family voice significant concern regarding Consuelo's weight status and a desire to try the most effective medication available to them to manage this issue. In 2023, the American Academy of Pediatrics released their clinical practice guidelines recommending the use of weight reducing pharmacotherapy for adolecents ages 12 and older with obesity. Untreated adolescent obesity has a high rate of tracking into adulthood with its antecedent cardiometabolic complications. The disease of obesity severely affects patients’ quality of life both physically and psychosocially. Data suggests that treatment of adolescent obesity is economically beneficial to the individual and the  "society as it saves long term costs of treatment of obesity complications. Failure to treat obesity with adjunct medication for appropriate patients represents a deviation from accepted evidence-based guidelines and contributes to ongoing stigma, bias and structural inequality related to this chronic medical condition.     Weekly injectable Zepbound was FDA approved for adults in May 2022 for the indication of chronic weight management. This approval was based on the results of a randomized controlled trial SURMOUNT-1 ([place] Journal of Medicine 2022), which demonstrated a mean reduction in BMI of 17.8%, compared to placebo at 68 weeks as well as greater improvements in blood sugar, lipids, and the liver enzyme ALT. Additionally, Zepbound was approved for sleep apnea in December 2024; it is currently the only medication approved for this indication.  Given advantages in efficacy and adherence, I am specifically requesting approval of coverage of Zepbound for Consuelo Treadwell     Without effective treatment for Consuelo’s severe obesity, including the use of adjunct medications like Zepbound, we anticipate that Berrys obesity will worsen and increase risk for developing additional comorbidities will rise. This undermines Consuelo's long-term health and quality of life. Consuelo cannot afford the cost of this medication without support from insurance.     In addition, under the federal Early Periodic Screening, Diagnosis and Treatment program, \"necessary health care services must be made available for treatment of all physical and mental illnesses or conditions discovered by any screening and diagnostic procedures.\"    Thank you very much for your consideration. Please feel free to contact me directly if you have any questions.        Kassandra Jj MD, DABOM  She/Her/Hers  Adolescent Medicine  Department of Pediatrics   of Pediatrics  [facility]   "

## 2025-07-30 PROCEDURE — RXMED WILLOW AMBULATORY MEDICATION CHARGE

## 2025-07-31 ENCOUNTER — PHARMACY VISIT (OUTPATIENT)
Dept: PHARMACY | Facility: CLINIC | Age: 20
End: 2025-07-31
Payer: MEDICAID

## 2025-07-31 LAB
ALBUMIN SERPL-MCNC: 4.6 G/DL (ref 3.6–5.1)
ALP SERPL-CCNC: 63 U/L (ref 36–130)
ALT SERPL-CCNC: 18 U/L (ref 9–46)
ANION GAP SERPL CALCULATED.4IONS-SCNC: 9 MMOL/L (CALC) (ref 7–17)
AST SERPL-CCNC: 20 U/L (ref 10–40)
BILIRUB SERPL-MCNC: 1.5 MG/DL (ref 0.2–1.2)
BUN SERPL-MCNC: 12 MG/DL (ref 7–25)
CALCIUM SERPL-MCNC: 9.7 MG/DL (ref 8.6–10.3)
CHLORIDE SERPL-SCNC: 101 MMOL/L (ref 98–110)
CHOLEST SERPL-MCNC: 210 MG/DL
CHOLEST/HDLC SERPL: 4.5 (CALC)
CO2 SERPL-SCNC: 30 MMOL/L (ref 20–32)
CREAT SERPL-MCNC: 0.9 MG/DL (ref 0.6–1.24)
EGFRCR SERPLBLD CKD-EPI 2021: 125 ML/MIN/1.73M2
ERYTHROCYTE [DISTWIDTH] IN BLOOD BY AUTOMATED COUNT: 13.1 % (ref 11–15)
EST. AVERAGE GLUCOSE BLD GHB EST-MCNC: 108 MG/DL
EST. AVERAGE GLUCOSE BLD GHB EST-SCNC: 6 MMOL/L
GLUCOSE SERPL-MCNC: 82 MG/DL (ref 65–99)
HBA1C MFR BLD: 5.4 %
HCT VFR BLD AUTO: 46.1 % (ref 38.5–50)
HCV AB SERPL QL IA: NORMAL
HDLC SERPL-MCNC: 47 MG/DL
HGB BLD-MCNC: 15.6 G/DL (ref 13.2–17.1)
HIV 1+2 AB+HIV1 P24 AG SERPL QL IA: NORMAL
HIV 1+2 AB+HIV1 P24 AG SERPL QL IA: NORMAL
LDLC SERPL CALC-MCNC: 137 MG/DL (CALC)
MCH RBC QN AUTO: 29.8 PG (ref 27–33)
MCHC RBC AUTO-ENTMCNC: 33.8 G/DL (ref 32–36)
MCV RBC AUTO: 88 FL (ref 80–100)
NONHDLC SERPL-MCNC: 163 MG/DL (CALC)
PLATELET # BLD AUTO: 268 THOUSAND/UL (ref 140–400)
PMV BLD REES-ECKER: 9.1 FL (ref 7.5–12.5)
POTASSIUM SERPL-SCNC: 4.1 MMOL/L (ref 3.5–5.3)
PROT SERPL-MCNC: 7.4 G/DL (ref 6.1–8.1)
RBC # BLD AUTO: 5.24 MILLION/UL (ref 4.2–5.8)
SODIUM SERPL-SCNC: 140 MMOL/L (ref 135–146)
T PALLIDUM AB SER QL IA: NEGATIVE
TRIGL SERPL-MCNC: 136 MG/DL
WBC # BLD AUTO: 10 THOUSAND/UL (ref 3.8–10.8)

## 2025-08-28 ENCOUNTER — PHARMACY VISIT (OUTPATIENT)
Dept: PHARMACY | Facility: CLINIC | Age: 20
End: 2025-08-28